# Patient Record
Sex: MALE | Race: ASIAN | NOT HISPANIC OR LATINO
[De-identification: names, ages, dates, MRNs, and addresses within clinical notes are randomized per-mention and may not be internally consistent; named-entity substitution may affect disease eponyms.]

---

## 2017-03-02 ENCOUNTER — TRANSCRIPTION ENCOUNTER (OUTPATIENT)
Age: 37
End: 2017-03-02

## 2017-03-03 ENCOUNTER — OUTPATIENT (OUTPATIENT)
Dept: OUTPATIENT SERVICES | Facility: HOSPITAL | Age: 37
LOS: 1 days | Discharge: HOME | End: 2017-03-03
Payer: MEDICAID

## 2017-03-03 VITALS
OXYGEN SATURATION: 98 % | HEART RATE: 74 BPM | SYSTOLIC BLOOD PRESSURE: 125 MMHG | DIASTOLIC BLOOD PRESSURE: 85 MMHG | RESPIRATION RATE: 16 BRPM

## 2017-03-03 VITALS
RESPIRATION RATE: 17 BRPM | DIASTOLIC BLOOD PRESSURE: 71 MMHG | OXYGEN SATURATION: 97 % | SYSTOLIC BLOOD PRESSURE: 116 MMHG | TEMPERATURE: 97 F | WEIGHT: 195.11 LBS | HEIGHT: 70 IN | HEART RATE: 82 BPM

## 2017-03-03 DIAGNOSIS — R10.9 UNSPECIFIED ABDOMINAL PAIN: ICD-10-CM

## 2017-03-03 DIAGNOSIS — B19.10 UNSPECIFIED VIRAL HEPATITIS B WITHOUT HEPATIC COMA: Chronic | ICD-10-CM

## 2017-03-03 PROCEDURE — 88305 TISSUE EXAM BY PATHOLOGIST: CPT | Mod: 26

## 2017-03-03 PROCEDURE — 88312 SPECIAL STAINS GROUP 1: CPT | Mod: 26

## 2017-03-03 RX ORDER — SODIUM CHLORIDE 9 MG/ML
1000 INJECTION, SOLUTION INTRAVENOUS
Qty: 0 | Refills: 0 | Status: DISCONTINUED | OUTPATIENT
Start: 2017-03-03 | End: 2017-03-03

## 2017-03-03 RX ADMIN — SODIUM CHLORIDE 75 MILLILITER(S): 9 INJECTION, SOLUTION INTRAVENOUS at 09:32

## 2017-03-03 NOTE — ASU DISCHARGE PLAN (ADULT/PEDIATRIC). - INSTRUCTIONS
Please call Dr. Hernandez's office at  to been seen in a follow up office visit two weeks after the completion of your procedure.

## 2017-03-03 NOTE — ASU DISCHARGE PLAN (ADULT/PEDIATRIC). - SPECIAL INSTRUCTIONS
No aspirin or aspirin like medications (e.g.  Motrin, Advil, Naprosyn, Aleve, etc.) Tylenol is fine.   Davis diet for lunch. Resume previous diet for dinner.

## 2017-03-06 LAB — SURGICAL PATHOLOGY FINAL REPORT - CH: SIGNIFICANT CHANGE UP

## 2017-03-08 DIAGNOSIS — B96.81 HELICOBACTER PYLORI [H. PYLORI] AS THE CAUSE OF DISEASES CLASSIFIED ELSEWHERE: ICD-10-CM

## 2017-03-08 DIAGNOSIS — K29.50 UNSPECIFIED CHRONIC GASTRITIS WITHOUT BLEEDING: ICD-10-CM

## 2017-03-08 DIAGNOSIS — K26.9 DUODENAL ULCER, UNSPECIFIED AS ACUTE OR CHRONIC, WITHOUT HEMORRHAGE OR PERFORATION: ICD-10-CM

## 2017-03-08 DIAGNOSIS — B19.10 UNSPECIFIED VIRAL HEPATITIS B WITHOUT HEPATIC COMA: ICD-10-CM

## 2017-03-08 DIAGNOSIS — K29.80 DUODENITIS WITHOUT BLEEDING: ICD-10-CM

## 2018-02-19 ENCOUNTER — EMERGENCY (EMERGENCY)
Facility: HOSPITAL | Age: 38
LOS: 0 days | Discharge: HOME | End: 2018-02-19
Attending: STUDENT IN AN ORGANIZED HEALTH CARE EDUCATION/TRAINING PROGRAM
Payer: MEDICAID

## 2018-02-19 VITALS
OXYGEN SATURATION: 99 % | DIASTOLIC BLOOD PRESSURE: 92 MMHG | RESPIRATION RATE: 18 BRPM | TEMPERATURE: 97 F | HEART RATE: 77 BPM | HEIGHT: 71 IN | WEIGHT: 195.11 LBS | SYSTOLIC BLOOD PRESSURE: 156 MMHG

## 2018-02-19 VITALS
TEMPERATURE: 98 F | OXYGEN SATURATION: 100 % | RESPIRATION RATE: 16 BRPM | SYSTOLIC BLOOD PRESSURE: 146 MMHG | HEART RATE: 84 BPM | DIASTOLIC BLOOD PRESSURE: 81 MMHG

## 2018-02-19 DIAGNOSIS — R10.31 RIGHT LOWER QUADRANT PAIN: ICD-10-CM

## 2018-02-19 DIAGNOSIS — B19.10 UNSPECIFIED VIRAL HEPATITIS B WITHOUT HEPATIC COMA: Chronic | ICD-10-CM

## 2018-02-19 DIAGNOSIS — B19.10 UNSPECIFIED VIRAL HEPATITIS B WITHOUT HEPATIC COMA: ICD-10-CM

## 2018-02-19 DIAGNOSIS — N20.0 CALCULUS OF KIDNEY: ICD-10-CM

## 2018-02-19 LAB
ALBUMIN SERPL ELPH-MCNC: 4.2 G/DL — SIGNIFICANT CHANGE UP (ref 3.3–5)
ALP SERPL-CCNC: 75 U/L — SIGNIFICANT CHANGE UP (ref 40–120)
ALT FLD-CCNC: 67 U/L — SIGNIFICANT CHANGE UP (ref 12–78)
AMYLASE P1 CFR SERPL: 43 U/L — SIGNIFICANT CHANGE UP (ref 25–115)
ANION GAP SERPL CALC-SCNC: 12 MMOL/L — SIGNIFICANT CHANGE UP (ref 5–17)
APPEARANCE UR: CLEAR — SIGNIFICANT CHANGE UP
AST SERPL-CCNC: 23 U/L — SIGNIFICANT CHANGE UP (ref 15–37)
BASOPHILS # BLD AUTO: 0.05 K/UL — SIGNIFICANT CHANGE UP (ref 0–0.2)
BASOPHILS NFR BLD AUTO: 0.5 % — SIGNIFICANT CHANGE UP (ref 0–2)
BILIRUB DIRECT SERPL-MCNC: 0.1 MG/DL — SIGNIFICANT CHANGE UP (ref 0.05–0.2)
BILIRUB INDIRECT FLD-MCNC: 0.2 MG/DL — SIGNIFICANT CHANGE UP (ref 0.2–1)
BILIRUB SERPL-MCNC: 0.3 MG/DL — SIGNIFICANT CHANGE UP (ref 0.2–1.2)
BILIRUB UR-MCNC: NEGATIVE — SIGNIFICANT CHANGE UP
BUN SERPL-MCNC: 12 MG/DL — SIGNIFICANT CHANGE UP (ref 7–23)
CALCIUM SERPL-MCNC: 9.1 MG/DL — SIGNIFICANT CHANGE UP (ref 8.5–10.1)
CHLORIDE SERPL-SCNC: 105 MMOL/L — SIGNIFICANT CHANGE UP (ref 96–108)
CO2 SERPL-SCNC: 22 MMOL/L — SIGNIFICANT CHANGE UP (ref 22–31)
COLOR SPEC: YELLOW — SIGNIFICANT CHANGE UP
CREAT SERPL-MCNC: 1.22 MG/DL — SIGNIFICANT CHANGE UP (ref 0.5–1.3)
DIFF PNL FLD: ABNORMAL
EOSINOPHIL # BLD AUTO: 0.29 K/UL — SIGNIFICANT CHANGE UP (ref 0–0.5)
EOSINOPHIL NFR BLD AUTO: 2.9 % — SIGNIFICANT CHANGE UP (ref 0–6)
EPI CELLS # UR: SIGNIFICANT CHANGE UP
GLUCOSE SERPL-MCNC: 134 MG/DL — HIGH (ref 70–99)
GLUCOSE UR QL: NEGATIVE MG/DL — SIGNIFICANT CHANGE UP
HCT VFR BLD CALC: 46.8 % — SIGNIFICANT CHANGE UP (ref 39–50)
HGB BLD-MCNC: 16.2 G/DL — SIGNIFICANT CHANGE UP (ref 13–17)
IMM GRANULOCYTES NFR BLD AUTO: 1.1 % — SIGNIFICANT CHANGE UP (ref 0–1.5)
KETONES UR-MCNC: NEGATIVE — SIGNIFICANT CHANGE UP
LEUKOCYTE ESTERASE UR-ACNC: NEGATIVE — SIGNIFICANT CHANGE UP
LIDOCAIN IGE QN: 140 U/L — SIGNIFICANT CHANGE UP (ref 73–393)
LYMPHOCYTES # BLD AUTO: 4.13 K/UL — HIGH (ref 1–3.3)
LYMPHOCYTES # BLD AUTO: 40.8 % — SIGNIFICANT CHANGE UP (ref 13–44)
MCHC RBC-ENTMCNC: 27 PG — SIGNIFICANT CHANGE UP (ref 27–34)
MCHC RBC-ENTMCNC: 34.6 GM/DL — SIGNIFICANT CHANGE UP (ref 32–36)
MCV RBC AUTO: 78 FL — LOW (ref 80–100)
MONOCYTES # BLD AUTO: 0.5 K/UL — SIGNIFICANT CHANGE UP (ref 0–0.9)
MONOCYTES NFR BLD AUTO: 4.9 % — SIGNIFICANT CHANGE UP (ref 2–14)
NEUTROPHILS # BLD AUTO: 5.04 K/UL — SIGNIFICANT CHANGE UP (ref 1.8–7.4)
NEUTROPHILS NFR BLD AUTO: 49.8 % — SIGNIFICANT CHANGE UP (ref 43–77)
NITRITE UR-MCNC: NEGATIVE — SIGNIFICANT CHANGE UP
NRBC # BLD: 0 /100 WBCS — SIGNIFICANT CHANGE UP (ref 0–0)
PH UR: 6 — SIGNIFICANT CHANGE UP (ref 5–8)
PLATELET # BLD AUTO: 278 K/UL — SIGNIFICANT CHANGE UP (ref 150–400)
POTASSIUM SERPL-MCNC: 3.9 MMOL/L — SIGNIFICANT CHANGE UP (ref 3.5–5.3)
POTASSIUM SERPL-SCNC: 3.9 MMOL/L — SIGNIFICANT CHANGE UP (ref 3.5–5.3)
PROT SERPL-MCNC: 8.2 GM/DL — SIGNIFICANT CHANGE UP (ref 6–8.3)
PROT UR-MCNC: 15 MG/DL
RBC # BLD: 6 M/UL — HIGH (ref 4.2–5.8)
RBC # FLD: 12.2 % — SIGNIFICANT CHANGE UP (ref 10.3–14.5)
RBC CASTS # UR COMP ASSIST: ABNORMAL /HPF (ref 0–4)
SODIUM SERPL-SCNC: 139 MMOL/L — SIGNIFICANT CHANGE UP (ref 135–145)
SP GR SPEC: 1.02 — SIGNIFICANT CHANGE UP (ref 1.01–1.02)
UROBILINOGEN FLD QL: NEGATIVE MG/DL — SIGNIFICANT CHANGE UP
WBC # BLD: 10.12 K/UL — SIGNIFICANT CHANGE UP (ref 3.8–10.5)
WBC # FLD AUTO: 10.12 K/UL — SIGNIFICANT CHANGE UP (ref 3.8–10.5)
WBC UR QL: SIGNIFICANT CHANGE UP

## 2018-02-19 PROCEDURE — 74176 CT ABD & PELVIS W/O CONTRAST: CPT | Mod: 26

## 2018-02-19 PROCEDURE — 99285 EMERGENCY DEPT VISIT HI MDM: CPT | Mod: 25

## 2018-02-19 RX ORDER — KETOROLAC TROMETHAMINE 30 MG/ML
30 SYRINGE (ML) INJECTION ONCE
Qty: 0 | Refills: 0 | Status: DISCONTINUED | OUTPATIENT
Start: 2018-02-19 | End: 2018-02-19

## 2018-02-19 RX ORDER — ONDANSETRON 8 MG/1
8 TABLET, FILM COATED ORAL ONCE
Qty: 0 | Refills: 0 | Status: COMPLETED | OUTPATIENT
Start: 2018-02-19 | End: 2018-02-19

## 2018-02-19 RX ORDER — MORPHINE SULFATE 50 MG/1
2 CAPSULE, EXTENDED RELEASE ORAL ONCE
Qty: 0 | Refills: 0 | Status: DISCONTINUED | OUTPATIENT
Start: 2018-02-19 | End: 2018-02-19

## 2018-02-19 RX ORDER — SODIUM CHLORIDE 9 MG/ML
2000 INJECTION INTRAMUSCULAR; INTRAVENOUS; SUBCUTANEOUS ONCE
Qty: 0 | Refills: 0 | Status: COMPLETED | OUTPATIENT
Start: 2018-02-19 | End: 2018-02-19

## 2018-02-19 RX ORDER — MOXIFLOXACIN HYDROCHLORIDE TABLETS, 400 MG 400 MG/1
1 TABLET, FILM COATED ORAL
Qty: 14 | Refills: 0
Start: 2018-02-19 | End: 2018-02-25

## 2018-02-19 RX ORDER — SODIUM CHLORIDE 9 MG/ML
1000 INJECTION INTRAMUSCULAR; INTRAVENOUS; SUBCUTANEOUS
Qty: 0 | Refills: 0 | Status: DISCONTINUED | OUTPATIENT
Start: 2018-02-19 | End: 2018-02-19

## 2018-02-19 RX ORDER — TAMSULOSIN HYDROCHLORIDE 0.4 MG/1
1 CAPSULE ORAL
Qty: 7 | Refills: 0
Start: 2018-02-19 | End: 2018-02-25

## 2018-02-19 RX ADMIN — Medication 30 MILLIGRAM(S): at 09:02

## 2018-02-19 RX ADMIN — Medication 30 MILLIGRAM(S): at 07:48

## 2018-02-19 RX ADMIN — ONDANSETRON 8 MILLIGRAM(S): 8 TABLET, FILM COATED ORAL at 07:48

## 2018-02-19 RX ADMIN — MORPHINE SULFATE 2 MILLIGRAM(S): 50 CAPSULE, EXTENDED RELEASE ORAL at 09:07

## 2018-02-19 RX ADMIN — MORPHINE SULFATE 2 MILLIGRAM(S): 50 CAPSULE, EXTENDED RELEASE ORAL at 11:47

## 2018-02-19 RX ADMIN — MORPHINE SULFATE 2 MILLIGRAM(S): 50 CAPSULE, EXTENDED RELEASE ORAL at 09:02

## 2018-02-19 RX ADMIN — SODIUM CHLORIDE 1000 MILLILITER(S): 9 INJECTION INTRAMUSCULAR; INTRAVENOUS; SUBCUTANEOUS at 07:48

## 2018-02-19 RX ADMIN — MORPHINE SULFATE 2 MILLIGRAM(S): 50 CAPSULE, EXTENDED RELEASE ORAL at 07:48

## 2018-02-19 NOTE — ED PROVIDER NOTE - OBJECTIVE STATEMENT
37 year old male presents today c/o sudden onset of right flank pain two hours ago, he describes a severe, sharp and constant pain radiating to his abd right lower abdomen (-) nausea or vomiting (-) fevers or chills, pt states that he has a previous history of kidney stones

## 2018-02-19 NOTE — ED ADULT TRIAGE NOTE - CHIEF COMPLAINT QUOTE
"I have pain" reports having upper right abdominal pain starting 2 hours pta, denies n/v/d/ fever, chills.

## 2018-02-19 NOTE — ED PROVIDER NOTE - CONSTITUTIONAL, MLM
normal... Well nourished, awake, alert, oriented to person, place, time/situation, pt is restless holding his right side and appears in pain

## 2018-02-19 NOTE — ED PROVIDER NOTE - CARE PLAN
Principal Discharge DX:	Flank pain Principal Discharge DX:	Flank pain  Secondary Diagnosis:	Kidney stone on right side

## 2018-02-19 NOTE — ED ADULT NURSE NOTE - OBJECTIVE STATEMENT
Patient alert with wife at bedside stating that he started to get right sided abdominal pain about 30 minutes ago. History of Hepatitis B and kidney stones. Takes naproxin at home for shoulder pain. Denies nausea, vomiting, diarrhea.

## 2018-02-20 LAB
CULTURE RESULTS: NO GROWTH — SIGNIFICANT CHANGE UP
SPECIMEN SOURCE: SIGNIFICANT CHANGE UP

## 2019-06-19 NOTE — ASU PATIENT PROFILE, ADULT - TEACHING/LEARNING RELIGIOUS CONSIDERATIONS
The Delivery OB Provider certifies that vaginal examination and/or abdominal examination after the delivery was done and no foreign body was found.
none

## 2019-07-30 ENCOUNTER — EMERGENCY (EMERGENCY)
Facility: HOSPITAL | Age: 39
LOS: 1 days | Discharge: HOME | End: 2019-07-30
Admitting: EMERGENCY MEDICINE
Payer: MEDICAID

## 2019-07-30 VITALS
HEART RATE: 75 BPM | OXYGEN SATURATION: 100 % | DIASTOLIC BLOOD PRESSURE: 94 MMHG | RESPIRATION RATE: 18 BRPM | TEMPERATURE: 98 F | SYSTOLIC BLOOD PRESSURE: 157 MMHG

## 2019-07-30 DIAGNOSIS — B19.10 UNSPECIFIED VIRAL HEPATITIS B WITHOUT HEPATIC COMA: Chronic | ICD-10-CM

## 2019-07-30 PROCEDURE — 99284 EMERGENCY DEPT VISIT MOD MDM: CPT

## 2019-07-30 NOTE — ED ADULT TRIAGE NOTE - CHIEF COMPLAINT QUOTE
Pt c/o right sided headache x3 days. Denies change in vision/n/v. Denies falls/head injury. Pt states he takes ibuprofen and Excedrin with little relief. PMH of Hepatitis B

## 2019-07-30 NOTE — ED ADULT TRIAGE NOTE - ESI TRIAGE ACUITY LEVEL, MLM
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

## 2019-07-31 VITALS
OXYGEN SATURATION: 100 % | RESPIRATION RATE: 18 BRPM | SYSTOLIC BLOOD PRESSURE: 137 MMHG | DIASTOLIC BLOOD PRESSURE: 86 MMHG | HEART RATE: 72 BPM | TEMPERATURE: 98 F

## 2019-07-31 LAB
ANION GAP SERPL CALC-SCNC: 10 MMO/L — SIGNIFICANT CHANGE UP (ref 7–14)
BASOPHILS # BLD AUTO: 0.03 K/UL — SIGNIFICANT CHANGE UP (ref 0–0.2)
BASOPHILS NFR BLD AUTO: 0.3 % — SIGNIFICANT CHANGE UP (ref 0–2)
BUN SERPL-MCNC: 11 MG/DL — SIGNIFICANT CHANGE UP (ref 7–23)
CALCIUM SERPL-MCNC: 9 MG/DL — SIGNIFICANT CHANGE UP (ref 8.4–10.5)
CHLORIDE SERPL-SCNC: 105 MMOL/L — SIGNIFICANT CHANGE UP (ref 98–107)
CO2 SERPL-SCNC: 24 MMOL/L — SIGNIFICANT CHANGE UP (ref 22–31)
CREAT SERPL-MCNC: 0.9 MG/DL — SIGNIFICANT CHANGE UP (ref 0.5–1.3)
EOSINOPHIL # BLD AUTO: 0.62 K/UL — HIGH (ref 0–0.5)
EOSINOPHIL NFR BLD AUTO: 7.1 % — HIGH (ref 0–6)
GLUCOSE SERPL-MCNC: 126 MG/DL — HIGH (ref 70–99)
HCT VFR BLD CALC: 44 % — SIGNIFICANT CHANGE UP (ref 39–50)
HGB BLD-MCNC: 14.3 G/DL — SIGNIFICANT CHANGE UP (ref 13–17)
IMM GRANULOCYTES NFR BLD AUTO: 0.3 % — SIGNIFICANT CHANGE UP (ref 0–1.5)
LYMPHOCYTES # BLD AUTO: 4 K/UL — HIGH (ref 1–3.3)
LYMPHOCYTES # BLD AUTO: 45.7 % — HIGH (ref 13–44)
MCHC RBC-ENTMCNC: 26.7 PG — LOW (ref 27–34)
MCHC RBC-ENTMCNC: 32.5 % — SIGNIFICANT CHANGE UP (ref 32–36)
MCV RBC AUTO: 82.1 FL — SIGNIFICANT CHANGE UP (ref 80–100)
MONOCYTES # BLD AUTO: 0.57 K/UL — SIGNIFICANT CHANGE UP (ref 0–0.9)
MONOCYTES NFR BLD AUTO: 6.5 % — SIGNIFICANT CHANGE UP (ref 2–14)
NEUTROPHILS # BLD AUTO: 3.5 K/UL — SIGNIFICANT CHANGE UP (ref 1.8–7.4)
NEUTROPHILS NFR BLD AUTO: 40.1 % — LOW (ref 43–77)
NRBC # FLD: 0 K/UL — SIGNIFICANT CHANGE UP (ref 0–0)
PLATELET # BLD AUTO: 248 K/UL — SIGNIFICANT CHANGE UP (ref 150–400)
PMV BLD: 10 FL — SIGNIFICANT CHANGE UP (ref 7–13)
POTASSIUM SERPL-MCNC: 3.9 MMOL/L — SIGNIFICANT CHANGE UP (ref 3.5–5.3)
POTASSIUM SERPL-SCNC: 3.9 MMOL/L — SIGNIFICANT CHANGE UP (ref 3.5–5.3)
RBC # BLD: 5.36 M/UL — SIGNIFICANT CHANGE UP (ref 4.2–5.8)
RBC # FLD: 12.1 % — SIGNIFICANT CHANGE UP (ref 10.3–14.5)
SODIUM SERPL-SCNC: 139 MMOL/L — SIGNIFICANT CHANGE UP (ref 135–145)
WBC # BLD: 8.75 K/UL — SIGNIFICANT CHANGE UP (ref 3.8–10.5)
WBC # FLD AUTO: 8.75 K/UL — SIGNIFICANT CHANGE UP (ref 3.8–10.5)

## 2019-07-31 PROCEDURE — 70450 CT HEAD/BRAIN W/O DYE: CPT | Mod: 26

## 2019-07-31 RX ORDER — ACETAMINOPHEN 500 MG
975 TABLET ORAL ONCE
Refills: 0 | Status: COMPLETED | OUTPATIENT
Start: 2019-07-31 | End: 2019-07-31

## 2019-07-31 RX ORDER — MAGNESIUM SULFATE 500 MG/ML
2 VIAL (ML) INJECTION ONCE
Refills: 0 | Status: COMPLETED | OUTPATIENT
Start: 2019-07-31 | End: 2019-07-31

## 2019-07-31 RX ORDER — METOCLOPRAMIDE HCL 10 MG
10 TABLET ORAL ONCE
Refills: 0 | Status: COMPLETED | OUTPATIENT
Start: 2019-07-31 | End: 2019-07-31

## 2019-07-31 RX ORDER — SODIUM CHLORIDE 9 MG/ML
1000 INJECTION INTRAMUSCULAR; INTRAVENOUS; SUBCUTANEOUS ONCE
Refills: 0 | Status: COMPLETED | OUTPATIENT
Start: 2019-07-31 | End: 2019-07-31

## 2019-07-31 RX ORDER — KETOROLAC TROMETHAMINE 30 MG/ML
15 SYRINGE (ML) INJECTION ONCE
Refills: 0 | Status: DISCONTINUED | OUTPATIENT
Start: 2019-07-31 | End: 2019-07-31

## 2019-07-31 RX ADMIN — SODIUM CHLORIDE 1000 MILLILITER(S): 9 INJECTION INTRAMUSCULAR; INTRAVENOUS; SUBCUTANEOUS at 01:50

## 2019-07-31 RX ADMIN — Medication 15 MILLIGRAM(S): at 00:36

## 2019-07-31 RX ADMIN — Medication 15 MILLIGRAM(S): at 01:59

## 2019-07-31 RX ADMIN — SODIUM CHLORIDE 1000 MILLILITER(S): 9 INJECTION INTRAMUSCULAR; INTRAVENOUS; SUBCUTANEOUS at 00:37

## 2019-07-31 RX ADMIN — Medication 50 GRAM(S): at 03:29

## 2019-07-31 RX ADMIN — SODIUM CHLORIDE 1000 MILLILITER(S): 9 INJECTION INTRAMUSCULAR; INTRAVENOUS; SUBCUTANEOUS at 01:59

## 2019-07-31 RX ADMIN — Medication 15 MILLIGRAM(S): at 02:29

## 2019-07-31 RX ADMIN — SODIUM CHLORIDE 1000 MILLILITER(S): 9 INJECTION INTRAMUSCULAR; INTRAVENOUS; SUBCUTANEOUS at 01:22

## 2019-07-31 RX ADMIN — Medication 975 MILLIGRAM(S): at 03:58

## 2019-07-31 RX ADMIN — Medication 10 MILLIGRAM(S): at 00:36

## 2019-07-31 RX ADMIN — Medication 975 MILLIGRAM(S): at 03:28

## 2019-07-31 RX ADMIN — Medication 15 MILLIGRAM(S): at 00:51

## 2019-07-31 NOTE — ED ADULT NURSE NOTE - OBJECTIVE STATEMENT
Pt. received in intake room 5, A&Ox4, ambulatory. Pt. c/o right sided headache x 3 days. Denies vision changes, n/v/d, trauma, fall, injury, chest pain, SOB, dizziness, weakness. Reports taking ibuprofen and excedrin with some relief at home yesterday. Respirations are even and unlabored on room air. 20 gauge IV inserted in right hand, labs sent. Medication given as per order. MD evaluation in progress.

## 2019-07-31 NOTE — ED ADULT NURSE REASSESSMENT NOTE - NS ED NURSE REASSESS COMMENT FT1
Pt reports pain still 8/10 with no relief from pain medication. PA was at bedside for evaluation. Awaiting further orders at this time. Will continue to monitor.

## 2019-07-31 NOTE — ED PROVIDER NOTE - CLINICAL SUMMARY MEDICAL DECISION MAKING FREE TEXT BOX
38 yo M, nonsmoker with PMH of Hep B with treatment in the past, who presents to the ER c/o right side headache x2 days. Well appearing male p/w acute gradual onset of headache x2 days, constant, no improvement with pain med, no head trauma, not on anticoagulation, no visual disturbances, no weakness, no numbness, no family hx of brain aneurysm/hemorrhage, no neck stiffness, no nuchal rigidity, afebrile in ED ; pt is AAOx3, GCS of 15, no focal neuro deficits, ambulatory, likely migraine, dehydration. No CT head indicated at this time. Discussed plan with patient & wife, patient agrees with treatment, and if no improvement, will consider CT head. Plan: obtain labs, give IVF for hydration, Reglan & Toradol for headache, and reassess.

## 2019-07-31 NOTE — ED PROVIDER NOTE - NSFOLLOWUPINSTRUCTIONS_ED_ALL_ED_FT
Rest, drink plenty of fluids.  Advance activity as tolerated. Take Tylenol 650mg (Two 325 mg pills) every 4-6 hours as needed for pain. Take Motrin 600 mg every 8 hours as needed for moderate pain -- take with food.  Follow up with your primary care physician and neurology in 48-72 hours- bring copies of your results.  Return to the ER for worsening or persistent symptoms, and/or ANY NEW OR CONCERNING SYMPTOMS. If you have issues obtaining follow up, please call: [phone] to obtain a doctor or specialist who takes your insurance in your area.

## 2019-07-31 NOTE — ED ADULT NURSE NOTE - NSIMPLEMENTINTERV_GEN_ALL_ED
Implemented All Universal Safety Interventions:  [place] to call system. Call bell, personal items and telephone within reach. Instruct patient to call for assistance. Room bathroom lighting operational. Non-slip footwear when patient is off stretcher. Physically safe environment: no spills, clutter or unnecessary equipment. Stretcher in lowest position, wheels locked, appropriate side rails in place.

## 2019-07-31 NOTE — ED PROVIDER NOTE - PROGRESS NOTE DETAILS
PA KATELYN: Patient reassessed, lying comfortably in bed in NAD, denies any complaints. States pain 8/10, not much improvement. Pt given additional IVF, toradol total of 30mg and Reglan. Discussed plan with patient and wife, will order Magnesium, Tylenol 975mg, CT head given not much improvement of headache. Will continue to monitor and reassess. PA KATELYN: Patient reassessed, sitting comfortably in chair in NAD, denies any complaints. States feeling better, symptoms improved, pain 5/10. CT head neg acute finding. Pt is medically stable for discharge and follow up with PMD and neurology. The patient was given verbal and written discharge instructions. Specifically, instructions when to return to the ED and when to seek follow-up from their pcp was discussed. Any specialty follow-up was discussed, including how to make an appointment.  Instructions were discussed in simple, plain language and was understood by the patient. The patient understands that should their symptoms worsen or any new symptoms arise, they should return to the ED immediately for further evaluation. All pt's questions were answered. Patient verbalizes understanding.

## 2019-07-31 NOTE — ED PROVIDER NOTE - OBJECTIVE STATEMENT
38 yo M, nonsmoker with PMH of Hep B (treated) who presents to the ER c/o right side headache x2 days. Pt states onset of pain is gradual. Reports character of pain is sharp, constant, right temporal, 9/10 in severity, non-radiating, no aggravating or relieving factors. Admits taking Ibuprofen and Excedrin without any improvement. Admits having high blood pressure at home, . As per wife, patient has similar headache last year, also while having high blood pressure in 160's. Denies any fever, chills, n/v/d/c, dizziness, visual disturbances, neck stiffness, photophobia, head trauma, otalgia, chest pain, sob, abdominal pain, back pain, dysuria, weakness, numbness, recent travel, sick contact or any other complaints.

## 2019-07-31 NOTE — ED ADULT NURSE REASSESSMENT NOTE - NS ED NURSE REASSESS COMMENT FT1
Pt placed on cardiac monitor for administration of magnesium sulfate as per MD order. Pt given magnesium sulfate and tylenol as per MD order. Pt in no acute distress. Will continue to monitor.

## 2019-12-18 ENCOUNTER — EMERGENCY (EMERGENCY)
Facility: HOSPITAL | Age: 39
LOS: 1 days | Discharge: HOME | End: 2019-12-18
Admitting: EMERGENCY MEDICINE
Payer: MEDICAID

## 2019-12-18 VITALS
HEART RATE: 78 BPM | SYSTOLIC BLOOD PRESSURE: 142 MMHG | DIASTOLIC BLOOD PRESSURE: 85 MMHG | TEMPERATURE: 98 F | RESPIRATION RATE: 17 BRPM | OXYGEN SATURATION: 98 %

## 2019-12-18 DIAGNOSIS — B19.10 UNSPECIFIED VIRAL HEPATITIS B WITHOUT HEPATIC COMA: Chronic | ICD-10-CM

## 2019-12-18 LAB
ALBUMIN SERPL ELPH-MCNC: 4.8 G/DL — SIGNIFICANT CHANGE UP (ref 3.3–5)
ALP SERPL-CCNC: 63 U/L — SIGNIFICANT CHANGE UP (ref 40–120)
ALT FLD-CCNC: 32 U/L — SIGNIFICANT CHANGE UP (ref 4–41)
ANION GAP SERPL CALC-SCNC: 16 MMO/L — HIGH (ref 7–14)
AST SERPL-CCNC: 19 U/L — SIGNIFICANT CHANGE UP (ref 4–40)
BASOPHILS # BLD AUTO: 0.04 K/UL — SIGNIFICANT CHANGE UP (ref 0–0.2)
BASOPHILS NFR BLD AUTO: 0.4 % — SIGNIFICANT CHANGE UP (ref 0–2)
BILIRUB SERPL-MCNC: 0.4 MG/DL — SIGNIFICANT CHANGE UP (ref 0.2–1.2)
BUN SERPL-MCNC: 9 MG/DL — SIGNIFICANT CHANGE UP (ref 7–23)
CALCIUM SERPL-MCNC: 9.1 MG/DL — SIGNIFICANT CHANGE UP (ref 8.4–10.5)
CHLORIDE SERPL-SCNC: 99 MMOL/L — SIGNIFICANT CHANGE UP (ref 98–107)
CO2 SERPL-SCNC: 23 MMOL/L — SIGNIFICANT CHANGE UP (ref 22–31)
CREAT SERPL-MCNC: 0.9 MG/DL — SIGNIFICANT CHANGE UP (ref 0.5–1.3)
EOSINOPHIL # BLD AUTO: 0.55 K/UL — HIGH (ref 0–0.5)
EOSINOPHIL NFR BLD AUTO: 5.3 % — SIGNIFICANT CHANGE UP (ref 0–6)
GLUCOSE SERPL-MCNC: 102 MG/DL — HIGH (ref 70–99)
HCT VFR BLD CALC: 46.9 % — SIGNIFICANT CHANGE UP (ref 39–50)
HGB BLD-MCNC: 15.3 G/DL — SIGNIFICANT CHANGE UP (ref 13–17)
IMM GRANULOCYTES NFR BLD AUTO: 0.1 % — SIGNIFICANT CHANGE UP (ref 0–1.5)
LIDOCAIN IGE QN: 28.1 U/L — SIGNIFICANT CHANGE UP (ref 7–60)
LYMPHOCYTES # BLD AUTO: 4.38 K/UL — HIGH (ref 1–3.3)
LYMPHOCYTES # BLD AUTO: 42.1 % — SIGNIFICANT CHANGE UP (ref 13–44)
MCHC RBC-ENTMCNC: 26.4 PG — LOW (ref 27–34)
MCHC RBC-ENTMCNC: 32.6 % — SIGNIFICANT CHANGE UP (ref 32–36)
MCV RBC AUTO: 81 FL — SIGNIFICANT CHANGE UP (ref 80–100)
MONOCYTES # BLD AUTO: 0.72 K/UL — SIGNIFICANT CHANGE UP (ref 0–0.9)
MONOCYTES NFR BLD AUTO: 6.9 % — SIGNIFICANT CHANGE UP (ref 2–14)
NEUTROPHILS # BLD AUTO: 4.7 K/UL — SIGNIFICANT CHANGE UP (ref 1.8–7.4)
NEUTROPHILS NFR BLD AUTO: 45.2 % — SIGNIFICANT CHANGE UP (ref 43–77)
NRBC # FLD: 0 K/UL — SIGNIFICANT CHANGE UP (ref 0–0)
PLATELET # BLD AUTO: 270 K/UL — SIGNIFICANT CHANGE UP (ref 150–400)
PMV BLD: 9.9 FL — SIGNIFICANT CHANGE UP (ref 7–13)
POTASSIUM SERPL-MCNC: 3.6 MMOL/L — SIGNIFICANT CHANGE UP (ref 3.5–5.3)
POTASSIUM SERPL-SCNC: 3.6 MMOL/L — SIGNIFICANT CHANGE UP (ref 3.5–5.3)
PROT SERPL-MCNC: 7.7 G/DL — SIGNIFICANT CHANGE UP (ref 6–8.3)
RBC # BLD: 5.79 M/UL — SIGNIFICANT CHANGE UP (ref 4.2–5.8)
RBC # FLD: 11.9 % — SIGNIFICANT CHANGE UP (ref 10.3–14.5)
SODIUM SERPL-SCNC: 138 MMOL/L — SIGNIFICANT CHANGE UP (ref 135–145)
WBC # BLD: 10.4 K/UL — SIGNIFICANT CHANGE UP (ref 3.8–10.5)
WBC # FLD AUTO: 10.4 K/UL — SIGNIFICANT CHANGE UP (ref 3.8–10.5)

## 2019-12-18 PROCEDURE — 99284 EMERGENCY DEPT VISIT MOD MDM: CPT

## 2019-12-18 NOTE — ED PROVIDER NOTE - PATIENT PORTAL LINK FT
You can access the FollowMyHealth Patient Portal offered by [facility] by registering at the following website: [email or web address]. By joining [employer]’s FollowMyHealth portal, you will also be able to view your health information using other applications (apps) compatible with our system.

## 2019-12-18 NOTE — ED ADULT NURSE NOTE - OBJECTIVE STATEMENT
patient aaox4. came in with abdominal pain. patient reports he has been feeling pain for 2 days. denies dysuria, hematuria, constipation, diarrhea, acid reflux, nausea, vomiting. reports bloating and gas. did not eat or drink anything out of the ordinary. labs drawn and sent. Will continue to monitor

## 2019-12-18 NOTE — ED ADULT TRIAGE NOTE - CHIEF COMPLAINT QUOTE
pt arrives w/ c/o abdominal pain. pt states pain began after eating yesterday. pt states went to urgent and had decreased bowel sounds. pt denies any nausea or vomiting. pt states last BM last night. pmh Hep B.

## 2019-12-18 NOTE — ED PROVIDER NOTE - OBJECTIVE STATEMENT
38 y/o male hx HTN Hep B presents to ER c/o abdominal pain x 2 days. Pt. states for the past 2-3 days has been experiencing worsneing generalized abdominal pain with nausea - no vomit. States symptoms becmae much worse today - went to urgent care - was told decreased bowel sounds right side, had cxr showing stool burden - sent to ER for CT. Pt. c/o 8/10 pain. Dneies fever chills vomit diarrhea dizziness.

## 2019-12-19 VITALS
RESPIRATION RATE: 18 BRPM | OXYGEN SATURATION: 98 % | DIASTOLIC BLOOD PRESSURE: 97 MMHG | SYSTOLIC BLOOD PRESSURE: 137 MMHG | HEART RATE: 70 BPM

## 2019-12-19 PROCEDURE — 74177 CT ABD & PELVIS W/CONTRAST: CPT | Mod: 26

## 2019-12-19 RX ORDER — DOCUSATE SODIUM 100 MG
1 CAPSULE ORAL
Qty: 14 | Refills: 0
Start: 2019-12-19 | End: 2019-12-25

## 2019-12-19 RX ORDER — FAMOTIDINE 10 MG/ML
1 INJECTION INTRAVENOUS
Qty: 7 | Refills: 0
Start: 2019-12-19 | End: 2019-12-25

## 2020-07-07 ENCOUNTER — EMERGENCY (EMERGENCY)
Facility: HOSPITAL | Age: 40
LOS: 0 days | Discharge: HOME | End: 2020-07-07
Attending: EMERGENCY MEDICINE
Payer: MEDICAID

## 2020-07-07 VITALS
HEART RATE: 69 BPM | SYSTOLIC BLOOD PRESSURE: 169 MMHG | HEIGHT: 70 IN | RESPIRATION RATE: 17 BRPM | DIASTOLIC BLOOD PRESSURE: 107 MMHG | OXYGEN SATURATION: 100 % | TEMPERATURE: 99 F | WEIGHT: 181 LBS

## 2020-07-07 VITALS
DIASTOLIC BLOOD PRESSURE: 70 MMHG | HEART RATE: 55 BPM | TEMPERATURE: 98 F | RESPIRATION RATE: 16 BRPM | OXYGEN SATURATION: 98 % | SYSTOLIC BLOOD PRESSURE: 111 MMHG

## 2020-07-07 DIAGNOSIS — Z86.19 PERSONAL HISTORY OF OTHER INFECTIOUS AND PARASITIC DISEASES: ICD-10-CM

## 2020-07-07 DIAGNOSIS — N20.0 CALCULUS OF KIDNEY: ICD-10-CM

## 2020-07-07 DIAGNOSIS — B19.10 UNSPECIFIED VIRAL HEPATITIS B WITHOUT HEPATIC COMA: Chronic | ICD-10-CM

## 2020-07-07 DIAGNOSIS — R10.9 UNSPECIFIED ABDOMINAL PAIN: ICD-10-CM

## 2020-07-07 LAB
ALBUMIN SERPL ELPH-MCNC: 3.9 G/DL — SIGNIFICANT CHANGE UP (ref 3.3–5)
ALP SERPL-CCNC: 72 U/L — SIGNIFICANT CHANGE UP (ref 40–120)
ALT FLD-CCNC: 39 U/L — SIGNIFICANT CHANGE UP (ref 12–78)
ANION GAP SERPL CALC-SCNC: 5 MMOL/L — SIGNIFICANT CHANGE UP (ref 5–17)
APPEARANCE UR: CLEAR — SIGNIFICANT CHANGE UP
AST SERPL-CCNC: 18 U/L — SIGNIFICANT CHANGE UP (ref 15–37)
BACTERIA # UR AUTO: ABNORMAL
BASOPHILS # BLD AUTO: 0.04 K/UL — SIGNIFICANT CHANGE UP (ref 0–0.2)
BASOPHILS NFR BLD AUTO: 0.5 % — SIGNIFICANT CHANGE UP (ref 0–2)
BILIRUB SERPL-MCNC: 0.3 MG/DL — SIGNIFICANT CHANGE UP (ref 0.2–1.2)
BILIRUB UR-MCNC: NEGATIVE — SIGNIFICANT CHANGE UP
BUN SERPL-MCNC: 16 MG/DL — SIGNIFICANT CHANGE UP (ref 7–23)
CALCIUM SERPL-MCNC: 8.8 MG/DL — SIGNIFICANT CHANGE UP (ref 8.5–10.1)
CHLORIDE SERPL-SCNC: 109 MMOL/L — HIGH (ref 96–108)
CO2 SERPL-SCNC: 27 MMOL/L — SIGNIFICANT CHANGE UP (ref 22–31)
COLOR SPEC: YELLOW — SIGNIFICANT CHANGE UP
CREAT SERPL-MCNC: 1.05 MG/DL — SIGNIFICANT CHANGE UP (ref 0.5–1.3)
DIFF PNL FLD: ABNORMAL
EOSINOPHIL # BLD AUTO: 0.3 K/UL — SIGNIFICANT CHANGE UP (ref 0–0.5)
EOSINOPHIL NFR BLD AUTO: 3.5 % — SIGNIFICANT CHANGE UP (ref 0–6)
EPI CELLS # UR: SIGNIFICANT CHANGE UP
GLUCOSE SERPL-MCNC: 97 MG/DL — SIGNIFICANT CHANGE UP (ref 70–99)
GLUCOSE UR QL: NEGATIVE MG/DL — SIGNIFICANT CHANGE UP
HCT VFR BLD CALC: 42.8 % — SIGNIFICANT CHANGE UP (ref 39–50)
HGB BLD-MCNC: 13.9 G/DL — SIGNIFICANT CHANGE UP (ref 13–17)
IMM GRANULOCYTES NFR BLD AUTO: 0.1 % — SIGNIFICANT CHANGE UP (ref 0–1.5)
KETONES UR-MCNC: NEGATIVE — SIGNIFICANT CHANGE UP
LEUKOCYTE ESTERASE UR-ACNC: NEGATIVE — SIGNIFICANT CHANGE UP
LIDOCAIN IGE QN: 136 U/L — SIGNIFICANT CHANGE UP (ref 73–393)
LYMPHOCYTES # BLD AUTO: 2.9 K/UL — SIGNIFICANT CHANGE UP (ref 1–3.3)
LYMPHOCYTES # BLD AUTO: 33.5 % — SIGNIFICANT CHANGE UP (ref 13–44)
MCHC RBC-ENTMCNC: 26.8 PG — LOW (ref 27–34)
MCHC RBC-ENTMCNC: 32.5 GM/DL — SIGNIFICANT CHANGE UP (ref 32–36)
MCV RBC AUTO: 82.5 FL — SIGNIFICANT CHANGE UP (ref 80–100)
MONOCYTES # BLD AUTO: 0.6 K/UL — SIGNIFICANT CHANGE UP (ref 0–0.9)
MONOCYTES NFR BLD AUTO: 6.9 % — SIGNIFICANT CHANGE UP (ref 2–14)
NEUTROPHILS # BLD AUTO: 4.8 K/UL — SIGNIFICANT CHANGE UP (ref 1.8–7.4)
NEUTROPHILS NFR BLD AUTO: 55.5 % — SIGNIFICANT CHANGE UP (ref 43–77)
NITRITE UR-MCNC: NEGATIVE — SIGNIFICANT CHANGE UP
NRBC # BLD: 0 /100 WBCS — SIGNIFICANT CHANGE UP (ref 0–0)
PH UR: 5 — SIGNIFICANT CHANGE UP (ref 5–8)
PLATELET # BLD AUTO: 232 K/UL — SIGNIFICANT CHANGE UP (ref 150–400)
POTASSIUM SERPL-MCNC: 4 MMOL/L — SIGNIFICANT CHANGE UP (ref 3.5–5.3)
POTASSIUM SERPL-SCNC: 4 MMOL/L — SIGNIFICANT CHANGE UP (ref 3.5–5.3)
PROT SERPL-MCNC: 7.7 GM/DL — SIGNIFICANT CHANGE UP (ref 6–8.3)
PROT UR-MCNC: NEGATIVE MG/DL — SIGNIFICANT CHANGE UP
RBC # BLD: 5.19 M/UL — SIGNIFICANT CHANGE UP (ref 4.2–5.8)
RBC # FLD: 12.5 % — SIGNIFICANT CHANGE UP (ref 10.3–14.5)
RBC CASTS # UR COMP ASSIST: >50 /HPF (ref 0–4)
SODIUM SERPL-SCNC: 141 MMOL/L — SIGNIFICANT CHANGE UP (ref 135–145)
SP GR SPEC: 1.01 — SIGNIFICANT CHANGE UP (ref 1.01–1.02)
UROBILINOGEN FLD QL: NEGATIVE MG/DL — SIGNIFICANT CHANGE UP
WBC # BLD: 8.65 K/UL — SIGNIFICANT CHANGE UP (ref 3.8–10.5)
WBC # FLD AUTO: 8.65 K/UL — SIGNIFICANT CHANGE UP (ref 3.8–10.5)
WBC UR QL: SIGNIFICANT CHANGE UP

## 2020-07-07 PROCEDURE — 74176 CT ABD & PELVIS W/O CONTRAST: CPT | Mod: 26

## 2020-07-07 PROCEDURE — 99284 EMERGENCY DEPT VISIT MOD MDM: CPT

## 2020-07-07 RX ORDER — ONDANSETRON 8 MG/1
1 TABLET, FILM COATED ORAL
Qty: 15 | Refills: 0
Start: 2020-07-07 | End: 2020-07-11

## 2020-07-07 RX ORDER — ONDANSETRON 8 MG/1
4 TABLET, FILM COATED ORAL ONCE
Refills: 0 | Status: COMPLETED | OUTPATIENT
Start: 2020-07-07 | End: 2020-07-07

## 2020-07-07 RX ORDER — SODIUM CHLORIDE 9 MG/ML
1000 INJECTION INTRAMUSCULAR; INTRAVENOUS; SUBCUTANEOUS ONCE
Refills: 0 | Status: COMPLETED | OUTPATIENT
Start: 2020-07-07 | End: 2020-07-07

## 2020-07-07 RX ORDER — OXYCODONE AND ACETAMINOPHEN 5; 325 MG/1; MG/1
1 TABLET ORAL
Qty: 18 | Refills: 0
Start: 2020-07-07 | End: 2020-07-09

## 2020-07-07 RX ORDER — KETOROLAC TROMETHAMINE 30 MG/ML
30 SYRINGE (ML) INJECTION ONCE
Refills: 0 | Status: DISCONTINUED | OUTPATIENT
Start: 2020-07-07 | End: 2020-07-07

## 2020-07-07 RX ORDER — MORPHINE SULFATE 50 MG/1
4 CAPSULE, EXTENDED RELEASE ORAL ONCE
Refills: 0 | Status: DISCONTINUED | OUTPATIENT
Start: 2020-07-07 | End: 2020-07-07

## 2020-07-07 RX ORDER — IBUPROFEN 200 MG
1 TABLET ORAL
Qty: 30 | Refills: 0
Start: 2020-07-07 | End: 2020-07-16

## 2020-07-07 RX ADMIN — SODIUM CHLORIDE 1000 MILLILITER(S): 9 INJECTION INTRAMUSCULAR; INTRAVENOUS; SUBCUTANEOUS at 02:27

## 2020-07-07 RX ADMIN — SODIUM CHLORIDE 1000 MILLILITER(S): 9 INJECTION INTRAMUSCULAR; INTRAVENOUS; SUBCUTANEOUS at 02:52

## 2020-07-07 RX ADMIN — Medication 30 MILLIGRAM(S): at 02:52

## 2020-07-07 RX ADMIN — MORPHINE SULFATE 4 MILLIGRAM(S): 50 CAPSULE, EXTENDED RELEASE ORAL at 02:53

## 2020-07-07 RX ADMIN — ONDANSETRON 4 MILLIGRAM(S): 8 TABLET, FILM COATED ORAL at 02:53

## 2020-07-07 NOTE — ED PROVIDER NOTE - OBJECTIVE STATEMENT
40 year old male that has a history of renal stones came to the ED because of right flank pain that radiates to the groin. The pain started suddenly tonight and is similar to previous renal stone. no vomiting, no hematuria, no urinary complaints, no barh, no sob, no chest pain.

## 2020-07-07 NOTE — ED ADULT TRIAGE NOTE - CHIEF COMPLAINT QUOTE
R- side pain radiating to R- groin and R- flank x two hours. Hx kidney stones states it feels like kidney stone again

## 2020-07-07 NOTE — CONSULT NOTE ADULT - ASSESSMENT
A/P: 41yo Male with PMH Hep B and Nephrolithiasis presents to the ER c/o Right flank pain radiating to the right groin since last night. Found to have a right UVJ stone with mild right hydro, per ER attending patient passed his stone while in the ER.   Surgery consulted for diffusely enlarged appendix measuring 10-11mm, compared to 6mm in 12/19/19.  Abdominal exam benign, and patient comfortable with no complaints of abdominal pain. No leukocytosis, afebrile.     As per Dr. Guzman, patient surgically stable for d/c, can follow up in the office for elective appendectomy.

## 2020-07-07 NOTE — ED PROVIDER NOTE - CLINICAL SUMMARY MEDICAL DECISION MAKING FREE TEXT BOX
Patient with right flank pain, suggestive of renal stone, labs and imaging noted. Seen by surgery because of enlarged appendix. Pt passed the stone here. Surgery recommends outpatient follow up return to ED if worse. Precautions reviewed, on re-exam, abd soft, nt.

## 2020-07-07 NOTE — ED PROVIDER NOTE - CARE PROVIDER_API CALL
Delmy Guzman  SURGERY  [address] [facility] [address] [place], [place] [address]  Phone: [phone]  Fax: [phone]  Follow Up Time: Urgent

## 2020-07-07 NOTE — ED PROVIDER NOTE - PATIENT PORTAL LINK FT
You can access the FollowMyHealth Patient Portal offered by [facility] by registering at the following website: [email or web address]. By joining [employer]’s FollowMyHealth portal, you will also be able to view your health information using other applications (apps) compatible with our system.

## 2020-07-07 NOTE — CONSULT NOTE ADULT - SUBJECTIVE AND OBJECTIVE BOX
GENERAL SURGERY CONSULT NOTE    Patient is a 40y old  Male who presents with a chief complaint of Right flank pain.    HPI: 39yo Male with PMH Hep B and Nephrolithiasis presents to the ER c/o Right flank pain radiating to the right groin since last night. Patient states pain woke him from sleep at around 11:30pm, pain described as severe and sharp, rated as 10/10 in severity with no known alleviating or known triggering event. States pain felt similar to previous h/o kidney stones, had 2 attacks in the past which resolved with conservative management.   After receiving morphine and Toradol in ER, patient states his pain improved, no complaints offered.   Denies fever/chills, chest pain, sob, cough, weakness, abdominal pain, N/V/C/D, change in BM or urinary symptoms.       PAST MEDICAL & SURGICAL HISTORY:  Kidney stones  Hepatitis B  Hepatitis B      Review of Systems:  Contained within HPI    MEDICATIONS: none    Allergies  No Known Allergies      SOCIAL HISTORY   Denies ETOH or tobacco use.     FAMILY HISTORY:  No pertinent family history in first degree relatives      Vital Signs Last 24 Hrs  T(C): 36.5 (2020 05:23), Max: 37 (2020 01:46)  T(F): 97.7 (2020 05:23), Max: 98.6 (2020 01:46)  HR: 55 (2020 05:23) (55 - 69)  BP: 111/70 (2020 05:23) (111/70 - 169/107)  RR: 16 (2020 05:23) (16 - 17)  SpO2: 98% (2020 05:23) (98% - 100%)    Physical Exam:    General:  Appears stated age, well-groomed, well-nourished, no distress  Eyes : QAMAR  HENT:  WNL, no JVD  Chest:  clear breath sounds  Cardiovascular: S1S2,  Regular rate & rhythm  Abdomen: Nondistended, +BS, soft, nontender, no guarding/rebound. Mild Right CVA tenderness.  Extremities:  Calf nontender b/l. 2+ peripheral pulses b/l.   Skin:  No rash  Musculoskeletal:  normal strength  Neuro/Psych:  Alert, oriented to time, place and person       LABS:                        13.9   8.65  )-----------( 232      ( 2020 03:11 )             42.8     07-07    141  |  109<H>  |  16  ----------------------------<  97  4.0   |  27  |  1.05    Ca    8.8      2020 03:11    TPro  7.7  /  Alb  3.9  /  TBili  0.3  /  DBili  x   /  AST  18  /  ALT  39  /  AlkPhos  72  -      Urinalysis Basic - ( 2020 05:31 )    Color: Yellow / Appearance: Clear / S.010 / pH: x  Gluc: x / Ketone: Negative  / Bili: Negative / Urobili: Negative mg/dL   Blood: x / Protein: Negative mg/dL / Nitrite: Negative   Leuk Esterase: Negative / RBC: >50 /HPF / WBC 3-5   Sq Epi: x / Non Sq Epi: Occasional / Bacteria: Few    RADIOLOGY & ADDITIONAL STUDIES:  < from: CT Renal Stone Hunt (20 @ 03:15) >  EXAM:  CT RENAL STONE HUNT                            PROCEDURE DATE:  2020          INTERPRETATION:  CLINICAL INFORMATION: Right flank pain.    COMPARISON: 2019.    PROCEDURE:   CT of the Abdomen and Pelvis was performed without intravenous contrast.   Intravenous contrast: None.  Oral contrast: None.  Sagittal and coronal reformats were performed.    FINDINGS:  LOWER CHEST: Within normal limits.    LIVER: Within normal limits.  BILE DUCTS: Normal caliber.  GALLBLADDER: Within normal limits.  SPLEEN: Within normal limits.  PANCREAS: Within normal limits.  ADRENALS: Within normal limits.  KIDNEYS/URETERS: Mild right hydronephrosis and hydroureter caused by a 3 mm stone impacted at the right ureterovesical junction.  Two additional nonobstructing left lower pole renal stones both measure less than 2 mm.    BLADDER: Within normal limits.  REPRODUCTIVE ORGANS: The prostate is normal in size, measuring approximately 3.6 x 4 x 3.6 cm.    BOWEL: No bowel obstruction.  The appendixis diffusely enlarged, measuring 10 mm proximally, 10 mm at its midportion and 11 mm distally; increased in size from 6 mm on 2019.  No significant periappendiceal inflammatory change or reactive thickening of the cecal base is appreciated. There is mild right-sided colonic diverticulosis without evidence for acute diverticulitis.  PERITONEUM: No ascites.  VESSELS: Within normal limits.  RETROPERITONEUM/LYMPH NODES: No lymphadenopathy.    ABDOMINAL WALL: Within normal limits.  BONES: Withinnormal limits.    IMPRESSION:   Mild right hydronephrosis and hydroureter caused by a 3 mm stone impacted at the right ureterovesical junction.  Additional nonobstructing left lower pole renal stones measure less than 2 mm.    Diffusely enlarged appendix measuring 10-11 mm, compared to 6 mm on 2019.  No significant periappendiceal inflammatory change or reactive thickening of the cecal base is appreciated.  Early acute appendicitis should be excluded based on evolution of the patient's symptoms and laboratory values.    I discussed the findings with Dr. Stephens on 2020 at 5:25 AM.    < end of copied text >

## 2020-07-08 LAB
CULTURE RESULTS: SIGNIFICANT CHANGE UP
SPECIMEN SOURCE: SIGNIFICANT CHANGE UP

## 2020-07-20 ENCOUNTER — EMERGENCY (EMERGENCY)
Facility: HOSPITAL | Age: 40
LOS: 0 days | Discharge: HOME | End: 2020-07-20
Payer: MEDICAID

## 2020-07-20 VITALS
OXYGEN SATURATION: 100 % | SYSTOLIC BLOOD PRESSURE: 148 MMHG | WEIGHT: 184.09 LBS | TEMPERATURE: 98 F | DIASTOLIC BLOOD PRESSURE: 86 MMHG | HEIGHT: 70 IN | RESPIRATION RATE: 16 BRPM | HEART RATE: 86 BPM

## 2020-07-20 DIAGNOSIS — B19.10 UNSPECIFIED VIRAL HEPATITIS B WITHOUT HEPATIC COMA: Chronic | ICD-10-CM

## 2020-07-20 PROCEDURE — 99283 EMERGENCY DEPT VISIT LOW MDM: CPT

## 2020-07-20 RX ORDER — IBUPROFEN 200 MG
600 TABLET ORAL ONCE
Refills: 0 | Status: COMPLETED | OUTPATIENT
Start: 2020-07-20 | End: 2020-07-20

## 2020-07-20 RX ORDER — CIPROFLOXACIN HCL 0.3 %
1 DROPS OPHTHALMIC (EYE) ONCE
Refills: 0 | Status: COMPLETED | OUTPATIENT
Start: 2020-07-20 | End: 2020-07-20

## 2020-07-20 RX ORDER — DEXAMETHASONE 0.5 MG/5ML
12 ELIXIR ORAL ONCE
Refills: 0 | Status: COMPLETED | OUTPATIENT
Start: 2020-07-20 | End: 2020-07-20

## 2020-07-20 RX ORDER — CIPROFLOXACIN HCL 0.3 %
1 DROPS OPHTHALMIC (EYE) ONCE
Refills: 0 | Status: DISCONTINUED | OUTPATIENT
Start: 2020-07-20 | End: 2020-07-20

## 2020-07-20 RX ORDER — TENOFOVIR DISOPROXIL FUMARATE 300 MG/1
1 TABLET, FILM COATED ORAL
Qty: 0 | Refills: 0 | DISCHARGE

## 2020-07-20 RX ADMIN — Medication 12 MILLIGRAM(S): at 21:16

## 2020-07-20 RX ADMIN — Medication 1 DROP(S): at 21:25

## 2020-07-20 NOTE — ED PROVIDER NOTE - CLINICAL SUMMARY MEDICAL DECISION MAKING FREE TEXT BOX
39 yo male with h/o kidney stones and hepatitis B presents to the ED c/o right ear pain x 4 days. Patient went to  and was prescribed polytrim and levaquin with minimal improvement. Patient also taking motrin for pain. Associated with mild headache and throat pain. Denies fever, chills, chest pain, sob, neck pain/stiffness, chest pain, sob, abd pain, N/V, UE/LE weakness or paresthesias. no h/o DM. no recent water exposure. + discharge from ear. no hearing changes. + swelling and discharge to right external canal, unable to visualize TM. no mastoid tenderness. Unable to find wick in ED, instead piece of crystal was placed. Added on Augmentin  and changed ear drops to cipro and decadron. Recommend motrin for pain. Recommend follow up with ENT. PAtient understands return precautions.

## 2020-07-20 NOTE — ED PROVIDER NOTE - OBJECTIVE STATEMENT
41 yo male with h/o kidney stones and hepatitis B presents to the ED c/o right ear pain x 4 days. Patient went to  and was prescribed polytrim and levaquin with minimal improvement. Patient also taking motrin for pain. Associated with mild headache and throat pain. Denies fever, chills, chest pain, sob, neck pain/stiffness, chest pain, sob, abd pain, N/V, UE/LE weakness or paresthesias. no h/o DM. no recent water exposure. + discharge from ear. no hearing changes

## 2020-07-20 NOTE — ED PROVIDER NOTE - CARE PROVIDER_API CALL
Blake Brambila  OTOLARYNGOLOGY  [address] [place] [address], [place] [address]  Phone: [phone]  Fax: [phone]  Follow Up Time: 1-3 Days

## 2020-07-20 NOTE — ED ADULT TRIAGE NOTE - CHIEF COMPLAINT QUOTE
R- ear pain, headache, difficulty swallowing, prescribed with antibiotics 2 days ago from urgent care

## 2020-07-20 NOTE — ED PROVIDER NOTE - RIGHT EAR
EXTERNAL CANAL INFLAMMATION/unable to visualize TM due to swelling and discharge. no mastoid tenderness. + TTP auricular lymph node./AURICULAR/TRAGAL TENDERNESS/DRAINAGE

## 2020-07-20 NOTE — ED ADULT NURSE NOTE - OBJECTIVE STATEMENT
patient received, came here for right ear pain x 2 days, stated antibiotic not working, getting worse. with headache and slight dizziness

## 2020-07-20 NOTE — ED PROVIDER NOTE - PATIENT PORTAL LINK FT
You can access the FollowMyHealth Patient Portal offered by [facility] by registering at the following website: [email or web address]. By joining [employer]’s FollowMyHealth portal, you will also be able to view your health information using other applications (apps) compatible with our system.

## 2020-07-21 DIAGNOSIS — Z86.19 PERSONAL HISTORY OF OTHER INFECTIOUS AND PARASITIC DISEASES: ICD-10-CM

## 2020-07-21 DIAGNOSIS — H60.91 UNSPECIFIED OTITIS EXTERNA, RIGHT EAR: ICD-10-CM

## 2020-07-21 DIAGNOSIS — R51 HEADACHE: ICD-10-CM

## 2020-07-21 DIAGNOSIS — H92.01 OTALGIA, RIGHT EAR: ICD-10-CM

## 2020-07-21 DIAGNOSIS — R07.0 PAIN IN THROAT: ICD-10-CM

## 2020-07-21 DIAGNOSIS — Z87.442 PERSONAL HISTORY OF URINARY CALCULI: ICD-10-CM

## 2020-12-08 NOTE — ED PROVIDER NOTE - NEURO NEGATIVE STATEMENT, MLM
DISCHARGE
no loss of consciousness, no gait abnormality, no headache, no sensory deficits, and no weakness.

## 2022-01-10 ENCOUNTER — TRANSCRIPTION ENCOUNTER (OUTPATIENT)
Age: 42
End: 2022-01-10

## 2022-07-13 NOTE — ED ADULT TRIAGE NOTE - HEART RATE (BEATS/MIN)
86 Calcipotriene Counseling: Cantharidin Counseling:  I discussed with the patient the risks of Cantharidin including but not limited to pain, redness, burning, itching, and blistering.

## 2022-07-25 NOTE — ED PROVIDER NOTE - NEUROLOGICAL POSTURING
PCP: Mau Potts MD    Last appt: 2/7/2022  No future appointments. Requested Prescriptions     Pending Prescriptions Disp Refills    levETIRAcetam (KEPPRA) 500 mg tablet 180 Tablet 1     Sig: Take 1 Tablet by mouth two (2) times a day. normal

## 2022-11-03 ENCOUNTER — EMERGENCY (EMERGENCY)
Facility: HOSPITAL | Age: 42
LOS: 0 days | Discharge: HOME | End: 2022-11-04
Attending: STUDENT IN AN ORGANIZED HEALTH CARE EDUCATION/TRAINING PROGRAM

## 2022-11-03 VITALS
RESPIRATION RATE: 18 BRPM | SYSTOLIC BLOOD PRESSURE: 167 MMHG | HEIGHT: 70 IN | DIASTOLIC BLOOD PRESSURE: 103 MMHG | OXYGEN SATURATION: 100 % | HEART RATE: 87 BPM | TEMPERATURE: 98 F | WEIGHT: 201.94 LBS

## 2022-11-03 DIAGNOSIS — Z20.822 CONTACT WITH AND (SUSPECTED) EXPOSURE TO COVID-19: ICD-10-CM

## 2022-11-03 DIAGNOSIS — R51.9 HEADACHE, UNSPECIFIED: ICD-10-CM

## 2022-11-03 DIAGNOSIS — B19.10 UNSPECIFIED VIRAL HEPATITIS B WITHOUT HEPATIC COMA: Chronic | ICD-10-CM

## 2022-11-03 DIAGNOSIS — Z87.442 PERSONAL HISTORY OF URINARY CALCULI: ICD-10-CM

## 2022-11-03 DIAGNOSIS — I10 ESSENTIAL (PRIMARY) HYPERTENSION: ICD-10-CM

## 2022-11-03 PROCEDURE — 99285 EMERGENCY DEPT VISIT HI MDM: CPT

## 2022-11-03 NOTE — ED ADULT TRIAGE NOTE - CHIEF COMPLAINT QUOTE
pt presents to the ED c/o high BP, headache and tingling sensation to left side of head started today in AM. pt states had headache on right side w/ watery eyes yesterday and went to urgent care and told to come to ED yesterday for high BP and went to PCP yesterday given amlodipine 5mg and told to take. denies: blurry vision

## 2022-11-04 VITALS
RESPIRATION RATE: 22 BRPM | SYSTOLIC BLOOD PRESSURE: 122 MMHG | TEMPERATURE: 98 F | OXYGEN SATURATION: 98 % | DIASTOLIC BLOOD PRESSURE: 79 MMHG | HEART RATE: 97 BPM

## 2022-11-04 PROBLEM — B19.10 UNSPECIFIED VIRAL HEPATITIS B WITHOUT HEPATIC COMA: Chronic | Status: ACTIVE | Noted: 2020-07-07

## 2022-11-04 PROBLEM — N20.0 CALCULUS OF KIDNEY: Chronic | Status: ACTIVE | Noted: 2020-07-07

## 2022-11-04 LAB
ALBUMIN SERPL ELPH-MCNC: 4.2 G/DL — SIGNIFICANT CHANGE UP (ref 3.3–5)
ALP SERPL-CCNC: 66 U/L — SIGNIFICANT CHANGE UP (ref 40–120)
ALT FLD-CCNC: 74 U/L — SIGNIFICANT CHANGE UP (ref 12–78)
ANION GAP SERPL CALC-SCNC: 5 MMOL/L — SIGNIFICANT CHANGE UP (ref 5–17)
AST SERPL-CCNC: 26 U/L — SIGNIFICANT CHANGE UP (ref 15–37)
BASOPHILS # BLD AUTO: 0.03 K/UL — SIGNIFICANT CHANGE UP (ref 0–0.2)
BASOPHILS NFR BLD AUTO: 0.4 % — SIGNIFICANT CHANGE UP (ref 0–2)
BILIRUB SERPL-MCNC: 0.4 MG/DL — SIGNIFICANT CHANGE UP (ref 0.2–1.2)
BUN SERPL-MCNC: 15 MG/DL — SIGNIFICANT CHANGE UP (ref 7–23)
CALCIUM SERPL-MCNC: 8.8 MG/DL — SIGNIFICANT CHANGE UP (ref 8.5–10.1)
CHLORIDE SERPL-SCNC: 106 MMOL/L — SIGNIFICANT CHANGE UP (ref 96–108)
CO2 SERPL-SCNC: 29 MMOL/L — SIGNIFICANT CHANGE UP (ref 22–31)
CREAT SERPL-MCNC: 0.89 MG/DL — SIGNIFICANT CHANGE UP (ref 0.5–1.3)
EGFR: 110 ML/MIN/1.73M2 — SIGNIFICANT CHANGE UP
EOSINOPHIL # BLD AUTO: 0.33 K/UL — SIGNIFICANT CHANGE UP (ref 0–0.5)
EOSINOPHIL NFR BLD AUTO: 4.2 % — SIGNIFICANT CHANGE UP (ref 0–6)
FLUAV AG NPH QL: SIGNIFICANT CHANGE UP
FLUBV AG NPH QL: SIGNIFICANT CHANGE UP
GLUCOSE SERPL-MCNC: 112 MG/DL — HIGH (ref 70–99)
HCT VFR BLD CALC: 46 % — SIGNIFICANT CHANGE UP (ref 39–50)
HGB BLD-MCNC: 15.2 G/DL — SIGNIFICANT CHANGE UP (ref 13–17)
IMM GRANULOCYTES NFR BLD AUTO: 0.4 % — SIGNIFICANT CHANGE UP (ref 0–0.9)
LYMPHOCYTES # BLD AUTO: 3.62 K/UL — HIGH (ref 1–3.3)
LYMPHOCYTES # BLD AUTO: 46.5 % — HIGH (ref 13–44)
MCHC RBC-ENTMCNC: 26.8 PG — LOW (ref 27–34)
MCHC RBC-ENTMCNC: 33 G/DL — SIGNIFICANT CHANGE UP (ref 32–36)
MCV RBC AUTO: 81 FL — SIGNIFICANT CHANGE UP (ref 80–100)
MONOCYTES # BLD AUTO: 0.62 K/UL — SIGNIFICANT CHANGE UP (ref 0–0.9)
MONOCYTES NFR BLD AUTO: 8 % — SIGNIFICANT CHANGE UP (ref 2–14)
NEUTROPHILS # BLD AUTO: 3.16 K/UL — SIGNIFICANT CHANGE UP (ref 1.8–7.4)
NEUTROPHILS NFR BLD AUTO: 40.5 % — LOW (ref 43–77)
NRBC # BLD: 0 /100 WBCS — SIGNIFICANT CHANGE UP (ref 0–0)
PLATELET # BLD AUTO: 245 K/UL — SIGNIFICANT CHANGE UP (ref 150–400)
POTASSIUM SERPL-MCNC: 3.4 MMOL/L — LOW (ref 3.5–5.3)
POTASSIUM SERPL-SCNC: 3.4 MMOL/L — LOW (ref 3.5–5.3)
PROT SERPL-MCNC: 7.4 GM/DL — SIGNIFICANT CHANGE UP (ref 6–8.3)
RBC # BLD: 5.68 M/UL — SIGNIFICANT CHANGE UP (ref 4.2–5.8)
RBC # FLD: 12.3 % — SIGNIFICANT CHANGE UP (ref 10.3–14.5)
SARS-COV-2 RNA SPEC QL NAA+PROBE: SIGNIFICANT CHANGE UP
SODIUM SERPL-SCNC: 140 MMOL/L — SIGNIFICANT CHANGE UP (ref 135–145)
WBC # BLD: 8.06 K/UL — SIGNIFICANT CHANGE UP (ref 3.8–10.5)
WBC # FLD AUTO: 8.06 K/UL — SIGNIFICANT CHANGE UP (ref 3.8–10.5)

## 2022-11-04 PROCEDURE — 70450 CT HEAD/BRAIN W/O DYE: CPT | Mod: 26,MA

## 2022-11-04 RX ORDER — IBUPROFEN 200 MG
1 TABLET ORAL
Qty: 20 | Refills: 0
Start: 2022-11-04 | End: 2022-11-08

## 2022-11-04 RX ORDER — TRAMADOL HYDROCHLORIDE 50 MG/1
1 TABLET ORAL
Qty: 16 | Refills: 0
Start: 2022-11-04 | End: 2022-11-07

## 2022-11-04 RX ORDER — TRAMADOL HYDROCHLORIDE 50 MG/1
50 TABLET ORAL ONCE
Refills: 0 | Status: DISCONTINUED | OUTPATIENT
Start: 2022-11-04 | End: 2022-11-04

## 2022-11-04 RX ORDER — IBUPROFEN 200 MG
600 TABLET ORAL ONCE
Refills: 0 | Status: COMPLETED | OUTPATIENT
Start: 2022-11-04 | End: 2022-11-04

## 2022-11-04 RX ADMIN — TRAMADOL HYDROCHLORIDE 50 MILLIGRAM(S): 50 TABLET ORAL at 03:56

## 2022-11-04 RX ADMIN — Medication 600 MILLIGRAM(S): at 09:54

## 2022-11-04 RX ADMIN — Medication 30 MILLILITER(S): at 09:52

## 2022-11-04 NOTE — ED ADULT NURSE NOTE - CINV DISCH TEACH PARTICIP
[Normal Growth] : growth [Normal Development] : development [Promoting Physical Activity] : promoting physical activity [] : The components of the vaccine(s) to be administered today are listed in the plan of care. The disease(s) for which the vaccine(s) are intended to prevent and the risks have been discussed with the caretaker.  The risks are also included in the appropriate vaccination information statements which have been provided to the patient's caregiver.  The caregiver has given consent to vaccinate. Patient

## 2022-11-04 NOTE — ED ADULT NURSE NOTE - OBJECTIVE STATEMENT
Covering primary RN Elena. A&ox4, c/o headache and high BP.  pt states headache and high BP and tingling sensation to left side of head started today in AM. pt states had headache on right side w/ watery eyes yesterday and went to urgent care yesterday and told to come to ED yesterday for high BP and went to PCP yesterday given amlodipine 5mg. pt states taking amlodipine, however states bp was still elevated.  denies: blurry vision, nausea, vomiting, weakness, chest pain, sob, fever, chills.

## 2022-11-04 NOTE — ED PROVIDER NOTE - OBJECTIVE STATEMENT
42 year old male recently diagnosed with HTN (started on amlodipine) presents today c/o headache which started yesterday, pt states that he was eating when he experienced severe pain to the right side of his head over his right which radiated into his right head associated with tearing in the right eye, pt found his bp to be elevated to 170/116, today pt had similar symptoms but his headache was located to his left head and again elevated bp, pt did go to his pmd and pt states that he was held in the office until his bp improved and started on amlodipine, pt came into due to headache and elevated bp (-) chest pain (-) sob (-) dizziness (-) nausea or vomiting (-) visual or speech changes

## 2022-11-04 NOTE — ED PROVIDER NOTE - PATIENT PORTAL LINK FT
You can access the FollowMyHealth Patient Portal offered by [facility] by registering at the following website: [email or web address]. By joining "[employer]’s FollowMyHealth portal, you will also be able to view your health information using other applications (apps) compatible with our system.

## 2022-11-04 NOTE — ED ADULT NURSE REASSESSMENT NOTE - NS ED NURSE REASSESS COMMENT FT1
pt alert and oriented x4. pt reports pain level of 4/10. pt vitals obtained and BP is 122/79. Provider spoke with pt at bedside and informed pt of d/c status. hep lock removed.

## 2022-11-04 NOTE — ED ADULT NURSE REASSESSMENT NOTE - NS ED NURSE REASSESS COMMENT FT1
pt requested bp to be repeated once before he leaves to go home. bp of 146/92 obtained. MD made aware and confirmed d/c as planned. pt made aware.

## 2022-11-04 NOTE — ED ADULT NURSE REASSESSMENT NOTE - NS ED NURSE REASSESS COMMENT FT1
MD made aware of [facility] lab value. pt dc home.  waiting at hospital entrance. pt dc paperwork reviewed and signed. hep lock removed. pt taken off CM. MD made aware of potassium lab value. pt dc home. pt dc paperwork reviewed and signed. hep lock removed. pt taken off CM.

## 2023-01-11 ENCOUNTER — APPOINTMENT (OUTPATIENT)
Dept: CARDIOLOGY | Facility: CLINIC | Age: 43
End: 2023-01-11
Payer: MEDICAID

## 2023-01-11 ENCOUNTER — NON-APPOINTMENT (OUTPATIENT)
Age: 43
End: 2023-01-11

## 2023-01-11 VITALS
SYSTOLIC BLOOD PRESSURE: 140 MMHG | HEART RATE: 90 BPM | HEIGHT: 70 IN | OXYGEN SATURATION: 97 % | WEIGHT: 196 LBS | BODY MASS INDEX: 28.06 KG/M2 | DIASTOLIC BLOOD PRESSURE: 84 MMHG

## 2023-01-11 VITALS — DIASTOLIC BLOOD PRESSURE: 86 MMHG | SYSTOLIC BLOOD PRESSURE: 144 MMHG

## 2023-01-11 DIAGNOSIS — Z86.19 PERSONAL HISTORY OF OTHER INFECTIOUS AND PARASITIC DISEASES: ICD-10-CM

## 2023-01-11 DIAGNOSIS — R51.9 HEADACHE, UNSPECIFIED: ICD-10-CM

## 2023-01-11 DIAGNOSIS — R94.39 ABNORMAL RESULT OF OTHER CARDIOVASCULAR FUNCTION STUDY: ICD-10-CM

## 2023-01-11 DIAGNOSIS — R29.818 OTHER SYMPTOMS AND SIGNS INVOLVING THE NERVOUS SYSTEM: ICD-10-CM

## 2023-01-11 DIAGNOSIS — Z86.79 PERSONAL HISTORY OF OTHER DISEASES OF THE CIRCULATORY SYSTEM: ICD-10-CM

## 2023-01-11 DIAGNOSIS — R07.89 OTHER CHEST PAIN: ICD-10-CM

## 2023-01-11 DIAGNOSIS — I10 ESSENTIAL (PRIMARY) HYPERTENSION: ICD-10-CM

## 2023-01-11 DIAGNOSIS — Z78.9 OTHER SPECIFIED HEALTH STATUS: ICD-10-CM

## 2023-01-11 PROBLEM — Z00.00 ENCOUNTER FOR PREVENTIVE HEALTH EXAMINATION: Status: ACTIVE | Noted: 2023-01-11

## 2023-01-11 PROCEDURE — 93015 CV STRESS TEST SUPVJ I&R: CPT

## 2023-01-11 PROCEDURE — 99204 OFFICE O/P NEW MOD 45 MIN: CPT | Mod: 25

## 2023-01-11 PROCEDURE — 93000 ELECTROCARDIOGRAM COMPLETE: CPT | Mod: 59

## 2023-01-11 RX ORDER — TENOFOVIR DISOPROXIL FUMARATE 300 MG/1
TABLET ORAL
Refills: 0 | Status: ACTIVE | COMMUNITY

## 2023-01-11 RX ORDER — AMLODIPINE BESYLATE 2.5 MG/1
2.5 TABLET ORAL DAILY
Qty: 90 | Refills: 3 | Status: ACTIVE | COMMUNITY
Start: 2023-01-11 | End: 1900-01-01

## 2023-01-11 NOTE — DISCUSSION/SUMMARY
[FreeTextEntry1] : Advised neuro eval if headaches persist.\par Add/rx amlodipine 2.5 mg QD, contin losartan, contin monitoring BP at home.\par Follow up 6 weeks.\par Check labs\par Stress test.\par Consider coronary calcium scoring.

## 2023-01-11 NOTE — HISTORY OF PRESENT ILLNESS
[FreeTextEntry1] : HEATHER VEE is a 42 year old male originally from [place], referred for evaluation of HTN, headaches and chest discomfort. Previously healthy and not checking BP.\par BP medication prescribed about 1-2 mo ago.  Was referred to ER from [facility] care for .  \par He checks BP occas at home and has been high 130s-140s/89-95.\par He works as [employer] .  Complains of bilat headache, and was associated with higher BP to 170 systolic.\par Headaches are constant, but have improved over the past 1-2 days.\par He complains of left sided chest heaviness that is constant for past couple of days. No ppt, exacerbating or alleviating factors.\par Not exercising.  He has not been going to gym lately due to bilat shoulder pains.\par No dyspnea.  \par Acc to wife he snores and sometimes when on his back appears to have apneic episodes.\par He notes daytime somnolence worse for past 2 weeks.\par \par FH - brother had CAD and  from MI age 49.  Father has angina.\par

## 2023-01-12 ENCOUNTER — NON-APPOINTMENT (OUTPATIENT)
Age: 43
End: 2023-01-12

## 2023-01-12 LAB
ALBUMIN SERPL ELPH-MCNC: 4.9 G/DL
ALP BLD-CCNC: 82 U/L
ALT SERPL-CCNC: 47 U/L
ANION GAP SERPL CALC-SCNC: 12 MMOL/L
AST SERPL-CCNC: 19 U/L
BASOPHILS # BLD AUTO: 0.04 K/UL
BASOPHILS NFR BLD AUTO: 0.4 %
BILIRUB SERPL-MCNC: 0.3 MG/DL
BUN SERPL-MCNC: 14 MG/DL
CALCIUM SERPL-MCNC: 9.7 MG/DL
CHLORIDE SERPL-SCNC: 102 MMOL/L
CHOLEST SERPL-MCNC: 177 MG/DL
CO2 SERPL-SCNC: 28 MMOL/L
CREAT SERPL-MCNC: 0.9 MG/DL
EGFR: 109 ML/MIN/1.73M2
EOSINOPHIL # BLD AUTO: 0.17 K/UL
EOSINOPHIL NFR BLD AUTO: 1.8 %
GLUCOSE SERPL-MCNC: 100 MG/DL
HCT VFR BLD CALC: 48.7 %
HDLC SERPL-MCNC: 38 MG/DL
HGB BLD-MCNC: 15.9 G/DL
IMM GRANULOCYTES NFR BLD AUTO: 0.4 %
LDLC SERPL CALC-MCNC: 96 MG/DL
LYMPHOCYTES # BLD AUTO: 3.38 K/UL
LYMPHOCYTES NFR BLD AUTO: 34.9 %
MAN DIFF?: NORMAL
MCHC RBC-ENTMCNC: 27 PG
MCHC RBC-ENTMCNC: 32.6 GM/DL
MCV RBC AUTO: 82.7 FL
MONOCYTES # BLD AUTO: 0.61 K/UL
MONOCYTES NFR BLD AUTO: 6.3 %
NEUTROPHILS # BLD AUTO: 5.45 K/UL
NEUTROPHILS NFR BLD AUTO: 56.2 %
NONHDLC SERPL-MCNC: 139 MG/DL
PLATELET # BLD AUTO: 297 K/UL
POTASSIUM SERPL-SCNC: 4.5 MMOL/L
PROT SERPL-MCNC: 7.6 G/DL
RBC # BLD: 5.89 M/UL
RBC # FLD: 13.1 %
SODIUM SERPL-SCNC: 142 MMOL/L
TRIGL SERPL-MCNC: 216 MG/DL
TSH SERPL-ACNC: 1.1 UIU/ML
WBC # FLD AUTO: 9.69 K/UL

## 2023-01-27 ENCOUNTER — APPOINTMENT (OUTPATIENT)
Dept: CARDIOLOGY | Facility: CLINIC | Age: 43
End: 2023-01-27
Payer: MEDICAID

## 2023-01-27 PROCEDURE — 93015 CV STRESS TEST SUPVJ I&R: CPT

## 2023-01-27 PROCEDURE — 95800 SLP STDY UNATTENDED: CPT

## 2023-01-27 PROCEDURE — 78452 HT MUSCLE IMAGE SPECT MULT: CPT

## 2023-01-27 PROCEDURE — A9500: CPT

## 2023-01-28 ENCOUNTER — FORM ENCOUNTER (OUTPATIENT)
Age: 43
End: 2023-01-28

## 2023-02-10 ENCOUNTER — NON-APPOINTMENT (OUTPATIENT)
Age: 43
End: 2023-02-10

## 2023-02-17 NOTE — ED ADULT NURSE NOTE - NS ED PATIENT SAFETY CONCERN
Hgb 9.0 unknown baseline,  MCV 94 2/2 to active hematemesis i/s/o known esophageal ulcer    - f/u iron studies  - trend CBC  - maintain active T&S (last (date))  - transfuse if Hgb <7 No

## 2023-03-09 ENCOUNTER — APPOINTMENT (OUTPATIENT)
Dept: PULMONOLOGY | Facility: CLINIC | Age: 43
End: 2023-03-09
Payer: MEDICAID

## 2023-03-09 DIAGNOSIS — G47.33 OBSTRUCTIVE SLEEP APNEA (ADULT) (PEDIATRIC): ICD-10-CM

## 2023-03-09 PROCEDURE — 99203 OFFICE O/P NEW LOW 30 MIN: CPT | Mod: 95

## 2023-03-09 NOTE — CONSULT LETTER
[Dear  ___] : Dear  [unfilled], [Consult Letter:] : I had the pleasure of evaluating your patient, [unfilled]. [( Thank you for referring [unfilled] for consultation for _____ )] : Thank you for referring [unfilled] for consultation for [unfilled] [Please see my note below.] : Please see my note below. [Consult Closing:] : Thank you very much for allowing me to participate in the care of this patient.  If you have any questions, please do not hesitate to contact me. [Sincerely,] : Sincerely, [FreeTextEntry2] : Dr. Magdiel Aguiar [FreeTextEntry3] : Page Patel MD, FAASM\par  of Medicine\par Associate , Fellowship in Pulmonary and Critical Care Medicine\par Division of Pulmonary, Critical Care & Sleep Medicine\par [employer] [facility]\par \par \par

## 2023-03-09 NOTE — ASSESSMENT
[FreeTextEntry1] : Mild JAQUELIN, very symptomatic and with comorbid HTN\par \par Plan:\par Explained the rationale for diagnosis and treatment of sleep apnea including its effect on quality of life and long term cardiovascular risk. \par Will order CPAP with supplies- [facility] as patient is a mouth breather.\par Patient instructed to call if he/she is not contacted by the "[employer] company in 4 weeks and to make a follow up appointment with me at 4-6 weeks after initiating therapy.\par Above discussed at length and he/she appears to understand.\par Weight loss was also recommended and discussed at length\par \par Will call the office with any issues or concerns as needed.\par \par

## 2023-03-09 NOTE — REVIEW OF SYSTEMS
[Nocturia] : nocturia [Difficulty Initiating Sleep] : difficulty falling asleep [Difficulty Maintaining Sleep] : no difficulty maintaining sleep [Lower Extremity Discomfort] : no lower extremity discomfort [Unusual Sleep Behavior] : no unusual sleep behavior [Hypersomnolence] : not sleeping much more than usual [Negative] : Psychiatric [FreeTextEntry3] : per HPI [FreeTextEntry8] : per HPI [FreeTextEntry9] : per HPI

## 2023-03-09 NOTE — HISTORY OF PRESENT ILLNESS
[Obstructive Sleep Apnea] : obstructive sleep apnea [FreeTextEntry1] : 41 yo M presents for initial evaluation of sleep disordered breathing\par Referred by Dr. Magdiel Aguiar. \par Accompanied by his wife Tanja\par PMH: HTN\par Watchpat HST 1/29/23: pAHI 2.2, RDI 8.2/hr\par \par Sleep history: \par Main complaints of nonrestorative sleep, severe snoring and daytime somnolence. Wife has witnessed occasional gasping/choking episodes\par Bed: 10-11 pm, Takes a while to fall asleep, has "a lot on his mind", wakes 1x to urinate, out of bed at 4:30-5 am \par Unrefreshed upon awakening. \par Morning headaches: yes\par Dry mouth/throat: yes\par Weight trend: stable\par Works as an [employer]  - denies drowsy driving, denies any accidents or near accidents. \par EDS with ESS of 12\par \par Quality Metrics:\par Smoking history: denies\par ESS: 12\par \par Vaccines: \par COVID: x2\par Influenza: did not receive\par Pneumococcal: NA\par \par Weight 206, height 5 10 - BMI 29\par

## 2023-07-18 ENCOUNTER — RX RENEWAL (OUTPATIENT)
Age: 43
End: 2023-07-18

## 2023-07-18 RX ORDER — LOSARTAN POTASSIUM 50 MG/1
50 TABLET, FILM COATED ORAL
Qty: 90 | Refills: 1 | Status: ACTIVE | COMMUNITY
Start: 2023-07-18 | End: 1900-01-01

## 2023-11-27 NOTE — ED ADULT NURSE NOTE - NS ED NURSE RECORD ANOTHER HT AND WT
Called to discuss results with patient. Minimal improvement w Augmentin therapy although does note discomfort has improved. Declined ordering CT Neck at this time d/t schedule conflicts this week. Has appt w ENT on 11/30 and would like to follow up with them before any additional imaging is ordered.   No

## 2024-09-05 NOTE — ED ADULT NURSE NOTE - BREATH SOUNDS, MLM
Refill Request - Controlled Substance    CONFIRM preferred pharmacy with the patient.    If Mail Order Rx - Pend for 90 day refill.        Last Seen Department: 3/28/2024  Last Seen by PCP: 3/28/2024    Last Written: 8/7/24 60 with no refills     Last UDS: 2/2/23     Med Agreement Signed On: 2/3/23    If no future appointment scheduled:  Review the last OV with PCP and review information for follow-up visit,  Route STAFF MESSAGE with patient name to the  Pool for scheduling with the following information:            -  Timing of next visit           -  Visit type ie Physical, OV, etc           -  Diagnoses/Reason ie. COPD, HTN - Do not use MEDICATION, Follow-up or CHECK UP - Give reason for visit        Next Appointment:   Future Appointments   Date Time Provider Department Center   9/30/2024  3:00 PM Yamila Carmona PA [facility] DE[facility]       Message sent to  to schedule appt with patient?  NO      Requested Prescriptions     Pending Prescriptions Disp Refills    amphetamine-dextroamphetamine (ADDERALL XR) 10 MG extended release capsule 60 capsule 0     Sig: Take 2 capsules by mouth every morning for 30 days. Max Daily Amount: 20 mg         
Clear

## 2024-11-12 ENCOUNTER — APPOINTMENT (OUTPATIENT)
Dept: ORTHOPEDIC SURGERY | Facility: CLINIC | Age: 44
End: 2024-11-12

## 2024-12-13 ENCOUNTER — APPOINTMENT (OUTPATIENT)
Dept: ORTHOPEDIC SURGERY | Facility: CLINIC | Age: 44
End: 2024-12-13
Payer: MEDICAID

## 2024-12-13 VITALS — WEIGHT: 192 LBS | HEIGHT: 70 IN | BODY MASS INDEX: 27.49 KG/M2

## 2024-12-13 DIAGNOSIS — G57.01 LESION OF SCIATIC NERVE, RIGHT LOWER LIMB: ICD-10-CM

## 2024-12-13 DIAGNOSIS — M16.11 UNILATERAL PRIMARY OSTEOARTHRITIS, RIGHT HIP: ICD-10-CM

## 2024-12-13 PROCEDURE — 72100 X-RAY EXAM L-S SPINE 2/3 VWS: CPT

## 2024-12-13 PROCEDURE — 99203 OFFICE O/P NEW LOW 30 MIN: CPT | Mod: 25

## 2024-12-13 PROCEDURE — 73502 X-RAY EXAM HIP UNI 2-3 VIEWS: CPT | Mod: RT

## 2024-12-18 ENCOUNTER — OUTPATIENT (OUTPATIENT)
Dept: OUTPATIENT SERVICES | Facility: HOSPITAL | Age: 44
LOS: 1 days | End: 2024-12-18
Payer: MEDICAID

## 2024-12-18 ENCOUNTER — APPOINTMENT (OUTPATIENT)
Dept: ULTRASOUND IMAGING | Facility: CLINIC | Age: 44
End: 2024-12-18
Payer: MEDICAID

## 2024-12-18 DIAGNOSIS — B19.10 UNSPECIFIED VIRAL HEPATITIS B WITHOUT HEPATIC COMA: Chronic | ICD-10-CM

## 2024-12-18 DIAGNOSIS — G57.01 LESION OF SCIATIC NERVE, RIGHT LOWER LIMB: ICD-10-CM

## 2024-12-18 PROCEDURE — 20611 DRAIN/INJ JOINT/BURSA W/US: CPT

## 2024-12-18 PROCEDURE — 20611 DRAIN/INJ JOINT/BURSA W/US: CPT | Mod: RT

## 2025-01-02 NOTE — ED ADULT NURSE NOTE - DOES PATIENT HAVE ADVANCE DIRECTIVE
Inpatient consult to Intensivist  Consult performed by: Raulito Cruz MD  Consult ordered by: Fang Landis DO          History Of Present Illness  Negin is a 68 year old female with recurrent admissions to the hospital with respiratory issues, comes to the emergency room today with complaints of shortness of breath.  She was in the emergency room yesterday with leg swelling and did not have shortness of breath at that time.  She states that she feels like she has pneumonia similar to prior episodes.  Each admission she is treated with antibiotics and has multiple antibiotic allergies.    Tonight in the emergency room she initially appeared comfortable with nasal cannula oxygen but after a long nebulizer treatment her breathing seemed to worsen.  She was placed on BiPAP and therefore intensivist was called for evaluation for ICU admission.  A venous blood gas has a pH of 7.34 with a pCO2 of 52.  She is now on BiPAP 10/5 with FiO2 45%.  Her saturation is 97%, but there is increased work of breathing.  She is sleepy but arousable to follow commands.      Past Medical History  Past Medical History:   Diagnosis Date    Acquired hypothyroidism 06/03/2020    Anticoagulated on Coumadin 06/03/2020    Atrial fibrillation  (CMD)     Eliquis    Cardiac pacemaker in situ 2022    Cellulitis in diabetic foot  (CMD) 05/19/2023    Cellulitis LE     resolved -- scar on back of right thigh from previous healed pressure sore    Cellulitis of right lower leg 02/23/2022    Cerebral infarction (CMD) 07/03/2024    per pt, left facial droop and slurred speach started on 7/3/2024, admitted to hospital 7/5 and small CVA noted on MRI by cardiology, see notes    Chronic anemia 06/03/2020    Chronic deep vein thrombosis (DVT) of distal vein of lower extremity  (CMD) 06/03/2020    Chronic diastolic congestive heart failure  (CMD) 02/17/2023    Chronic obstructive pulmonary disease  (CMD) 09/16/2022    Chronic pain syndrome 06/03/2020     COPD (chronic obstructive pulmonary disease)  (CMD)     Depression     Diabetes mellitus with coincident hypertension  (CMD) 06/03/2020    Diabetes mellitus, type 2  (CMD)     IDDM    Diabetic neuropathy  (CMD) 04/12/2022    DVT, RLE (deep venous thrombosis)  (CMD) 1994    Encephalopathy, unspecified 05/27/2020    Essential (primary) hypertension     Essential hypertension, malignant 07/19/2004    Gastro-esophageal reflux disease with esophagitis, without bleeding 04/12/2022    High cholesterol     Hypothyroidism     Major depressive disorder, single episode, mild (CMD) 06/03/2020    Methicillin resistant Staphylococcus aureus infection as the cause of diseases classified elsewhere 05/09/2022    Mild intermittent asthma with (acute) exacerbation (CMD) 09/16/2022    Morbid (severe) obesity due to excess calories  (CMD) 05/27/2020    Need for assistance with personal care 05/20/2022    Neuropathy     bilateral feet & hands    Obstructive sleep apnea 07/19/2004    last test 2001/// ++ restless leg syndrome,improved with iron replacement      Opioid dependence, uncomplicated  (CMD) 04/12/2022    Osteoarthritis of lumbar spine 01/15/2019    Parkinson's disease  (CMD) 09/16/2022    Paroxysmal atrial fibrillation  (CMD) 05/09/2022    Personal history of pulmonary embolism 06/03/2020    Pneumonia due to infectious organism, unspecified laterality, unspecified part of lung 11/01/2024    Presence of cardiac pacemaker 11/21/2022    Primary osteoarthritis of both knees 01/15/2019    Pulmonary embolism (CMD) 1994    Pure hypercholesterolemia 07/19/2004    Restless legs syndrome (RLS) 05/14/2007    resolved with iron replacement 5-07      Retention of urine 04/12/2022    Sepsis, unspecified organism  (CMD) 05/27/2020    Severe protein-calorie malnutrition (Chaudhari: less than 60% of standard weight)  (CMD) 05/20/2022    Stage 3 chronic kidney disease  (CMD) 08/04/2022    Type 2 diabetes mellitus with stage 3a chronic kidney  disease, with long-term current use of insulin  (CMD) 06/05/2020    Type 2 diabetes mellitus without complication  (CMD) 06/05/2020    Unspecified right bundle-branch block 05/20/2022    Urinary incontinence     UTI (urinary tract infection)     RESOLVED        Surgical History  Past Surgical History:   Procedure Laterality Date    Back surgery      Discectomy    Pacemaker implant  2022    [employer]    Repair of ureter  1994    Repair umbilical anand,<4y/o,reduc      Service to gastroenterology      Tonsillectomy          Medications  (Not in a hospital admission)    Current Facility-Administered Medications   Medication Dose Route Frequency Provider Last Rate Last Admin    doxycycline (VIBRAMYCIN) 100 mg in sodium chloride 0.9 % 100 mL IVPB  100 mg Intravenous Once Fang Landis  mL/hr at 01/01/25 2331 100 mg at 01/01/25 2331       Social History  Social History     Tobacco Use    Smoking status: Former     Types: Cigarettes    Smokeless tobacco: Never   Vaping Use    Vaping status: Some Days    Substances: Nicotine    Passive vaping exposure: Yes   Substance Use Topics    Alcohol use: Not Currently    Drug use: Never       Family History    Family History   Problem Relation Age of Onset    Goiter Mother     Patient is unaware of any medical problems Father     Cancer Daughter         thyroid cancer/hoshimotos    Goiter Maternal Grandmother         Allergies  ALLERGIES:  Ace inhibitors, Ampicillin-sulbactam sodium, Cefazolin, Ceftriaxone, Contrast media, Nitrofurantoin, Penicillins, Piperacillin sod-tazobactam so, Acetaminophen-codeine, Pioglitazone, Tramadol, and Tramadol hcl        Review of Systems  Unable due to condition.    Last Recorded Vitals  Blood pressure (!) 158/57, pulse 63, temperature 98.8 °F (37.1 °C), temperature source Oral, resp. rate 16, weight (!) 158 kg (348 lb 5.2 oz), SpO2 97%, not currently breastfeeding.     Physical Exam  Genl: Lethargic, arousable, on BiPAP, increased work of  breathing, hemodynamically stable  Skin:  Warm and dry without rash.    Head:  Normocephalic-atraumatic.   Neck:  Trachea is midline. No adenopathy.   Eyes:  Normal conjunctivae and sclerae.  Pupils equal, round, reactive to light.  Extraocular movements intact.    ENT:   Mucous membranes are moist.  No pharyngeal erythema or exudate.  No facial tenderness.  Cardiovascular:  Symmetrical pulses.  Regular rate and rhythm without murmur.  Respiratory:   Prolonged expiratory phase, expiratory wheezes bilaterally.  Gastrointestinal:  Soft and nontender.  Normal bowel sounds.  No hepatomegaly or splenomegaly.  No guarding or rebound tenderness.  No masses.  Morbidly obese.  Musculoskeletal:  No deformity or edema.  No tenderness to palpation.   Neurologic:   Lethargic but arousable.  Follows commands with hands and feet.      Labs  Recent Labs   Lab 01/01/25 2057 12/30/24  1859   SODIUM 137 137   POTASSIUM 5.0 4.6   CHLORIDE 110 106   CO2 25 27   BUN 29* 26*   CREATININE 1.46* 1.32*   CALCIUM 8.8 8.9   ALBUMIN 3.1* 3.1*   BILIRUBIN 0.3 0.2   ALKPT 129* 121*   GPT 14 12   AST 19 9   GLUCOSE 172* 178*      Recent Labs   Lab 01/01/25 2057 12/30/24  1859   WBC 5.1 5.6   RBC 3.14* 3.12*   HGB 8.7* 8.6*   HCT 28.4* 28.1*    197      Lab Results   Component Value Date    FERR 84 12/15/2024    FERR 239 05/27/2020    DDIMER 1.29 (H) 12/13/2024    DDIMER 1.11 (H) 05/23/2018       Encounter Date: 01/01/25   Electrocardiogram 12-Lead   Result Value    Ventricular Rate EKG/Min (BPM) 61    Atrial Rate (BPM) 61    QRS-Interval (MSEC) 142    QT-Interval (MSEC) 448    QTc 451    R Axis (Degrees) 39    T Axis (Degrees) -19    REPORT TEXT      Atrial-paced rhythm  with prolonged AV conduction  Right bundle branch block  T wave abnormality, consider lateral ischemia  Abnormal ECG  When compared with ECG of  15-DEC-2024 01:43,  Electronic atrial pacemaker  has replaced  Electronic ventricular pacemaker            Today's imaging:  XR  CHEST PA OR AP 1 VIEW    Result Date: 1/1/2025  EXAM: XR CHEST AP OR PA CLINICAL INDICATION: Shortness of breath. COMPARISON: Portable AP upright chest 12/13/2024 at 9:26 a.m.     FINDINGS/IMPRESSION: This AP portable upright chest is markedly limited due to patient body habitus. Cardiomegaly is noted with prominence of the pulmonary vascularity. The left lung is clear of acute consolidation. The left-sided pacemaker is unchanged in position. Area of early infiltrate is seen at the right lung base with a suggestion of a right pleural effusion which could be subpulmonic given the elevation of the right hemidiaphragm. Electronically Signed by: DORIS DELEON M.D. Signed on: 1/1/2025 9:41 PM Workstation ID: [id number]        Assessment and Plan    Acute on chronic hypoxic and hypercapnic respiratory failure  Possible pneumonia, many antibiotic allergies  History of A-fib and DVT, on Eliquis  Diabetes  Chronic pacemaker  Morbid obesity    BiPAP for respiratory muscle support  Arterial blood gas now  Steroids  Nebulized bronchodilators  Antibiotics while cultures pending  Check procalcitonin in the morning  ICU prophylaxis  Monitor in ICU for worsening respiratory status needing intubation  Critical care time 45 minutes    Raulito Cruz MD   01/01/25      No

## 2025-02-13 NOTE — ED ADULT NURSE NOTE - NSIMPLEMENTINTERV_GEN_ALL_ED
Will repeat thyroid ultrasound later this year and if still stable she will not need any further imaging   Implemented All Universal Safety Interventions:  [place] to call system. Call bell, personal items and telephone within reach. Instruct patient to call for assistance. Room bathroom lighting operational. Non-slip footwear when patient is off stretcher. Physically safe environment: no spills, clutter or unnecessary equipment. Stretcher in lowest position, wheels locked, appropriate side rails in place.

## 2025-04-30 ENCOUNTER — APPOINTMENT (OUTPATIENT)
Dept: NEUROLOGY | Facility: CLINIC | Age: 45
End: 2025-04-30

## 2025-05-04 ENCOUNTER — EMERGENCY (EMERGENCY)
Facility: HOSPITAL | Age: 45
LOS: 1 days | Discharge: HOME | End: 2025-05-06
Attending: STUDENT IN AN ORGANIZED HEALTH CARE EDUCATION/TRAINING PROGRAM | Admitting: INTERNAL MEDICINE
Payer: MEDICAID

## 2025-05-04 VITALS
OXYGEN SATURATION: 100 % | SYSTOLIC BLOOD PRESSURE: 148 MMHG | HEIGHT: 70 IN | WEIGHT: 174.83 LBS | TEMPERATURE: 99 F | HEART RATE: 100 BPM | DIASTOLIC BLOOD PRESSURE: 98 MMHG | RESPIRATION RATE: 16 BRPM

## 2025-05-04 DIAGNOSIS — R20.0 ANESTHESIA OF SKIN: ICD-10-CM

## 2025-05-04 DIAGNOSIS — Z87.442 PERSONAL HISTORY OF URINARY CALCULI: ICD-10-CM

## 2025-05-04 DIAGNOSIS — Z86.19 PERSONAL HISTORY OF OTHER INFECTIOUS AND PARASITIC DISEASES: ICD-10-CM

## 2025-05-04 DIAGNOSIS — I10 ESSENTIAL (PRIMARY) HYPERTENSION: ICD-10-CM

## 2025-05-04 DIAGNOSIS — B19.10 UNSPECIFIED VIRAL HEPATITIS B WITHOUT HEPATIC COMA: Chronic | ICD-10-CM

## 2025-05-04 LAB
ALBUMIN SERPL ELPH-MCNC: 3.7 G/DL — SIGNIFICANT CHANGE UP (ref 3.3–5)
ALP SERPL-CCNC: 73 U/L — SIGNIFICANT CHANGE UP (ref 40–120)
ALT FLD-CCNC: 46 U/L — SIGNIFICANT CHANGE UP (ref 12–78)
ANION GAP SERPL CALC-SCNC: 4 MMOL/L — LOW (ref 5–17)
APTT BLD: 31.6 SEC — SIGNIFICANT CHANGE UP (ref 26.1–36.8)
AST SERPL-CCNC: 26 U/L — SIGNIFICANT CHANGE UP (ref 15–37)
BASOPHILS # BLD AUTO: 0.02 K/UL — SIGNIFICANT CHANGE UP (ref 0–0.2)
BASOPHILS NFR BLD AUTO: 0.3 % — SIGNIFICANT CHANGE UP (ref 0–2)
BILIRUB SERPL-MCNC: 0.6 MG/DL — SIGNIFICANT CHANGE UP (ref 0.2–1.2)
BUN SERPL-MCNC: 11 MG/DL — SIGNIFICANT CHANGE UP (ref 7–23)
CALCIUM SERPL-MCNC: 8.7 MG/DL — SIGNIFICANT CHANGE UP (ref 8.5–10.1)
CHLORIDE SERPL-SCNC: 104 MMOL/L — SIGNIFICANT CHANGE UP (ref 96–108)
CO2 SERPL-SCNC: 29 MMOL/L — SIGNIFICANT CHANGE UP (ref 22–31)
CREAT SERPL-MCNC: 0.92 MG/DL — SIGNIFICANT CHANGE UP (ref 0.5–1.3)
EGFR: 105 ML/MIN/1.73M2 — SIGNIFICANT CHANGE UP
EGFR: 105 ML/MIN/1.73M2 — SIGNIFICANT CHANGE UP
EOSINOPHIL # BLD AUTO: 0.19 K/UL — SIGNIFICANT CHANGE UP (ref 0–0.5)
EOSINOPHIL NFR BLD AUTO: 2.9 % — SIGNIFICANT CHANGE UP (ref 0–6)
GLUCOSE SERPL-MCNC: 102 MG/DL — HIGH (ref 70–99)
HCT VFR BLD CALC: 43.4 % — SIGNIFICANT CHANGE UP (ref 39–50)
HGB BLD-MCNC: 14.6 G/DL — SIGNIFICANT CHANGE UP (ref 13–17)
IMM GRANULOCYTES NFR BLD AUTO: 0.3 % — SIGNIFICANT CHANGE UP (ref 0–0.9)
INR BLD: 1.17 RATIO — HIGH (ref 0.85–1.16)
LYMPHOCYTES # BLD AUTO: 2.19 K/UL — SIGNIFICANT CHANGE UP (ref 1–3.3)
LYMPHOCYTES # BLD AUTO: 33.3 % — SIGNIFICANT CHANGE UP (ref 13–44)
MCHC RBC-ENTMCNC: 27.3 PG — SIGNIFICANT CHANGE UP (ref 27–34)
MCHC RBC-ENTMCNC: 33.6 G/DL — SIGNIFICANT CHANGE UP (ref 32–36)
MCV RBC AUTO: 81.1 FL — SIGNIFICANT CHANGE UP (ref 80–100)
MONOCYTES # BLD AUTO: 0.76 K/UL — SIGNIFICANT CHANGE UP (ref 0–0.9)
MONOCYTES NFR BLD AUTO: 11.6 % — SIGNIFICANT CHANGE UP (ref 2–14)
NEUTROPHILS # BLD AUTO: 3.39 K/UL — SIGNIFICANT CHANGE UP (ref 1.8–7.4)
NEUTROPHILS NFR BLD AUTO: 51.6 % — SIGNIFICANT CHANGE UP (ref 43–77)
NRBC BLD AUTO-RTO: 0 /100 WBCS — SIGNIFICANT CHANGE UP (ref 0–0)
PLATELET # BLD AUTO: 200 K/UL — SIGNIFICANT CHANGE UP (ref 150–400)
POTASSIUM SERPL-MCNC: 3.5 MMOL/L — SIGNIFICANT CHANGE UP (ref 3.5–5.3)
POTASSIUM SERPL-SCNC: 3.5 MMOL/L — SIGNIFICANT CHANGE UP (ref 3.5–5.3)
PROT SERPL-MCNC: 7.9 GM/DL — SIGNIFICANT CHANGE UP (ref 6–8.3)
PROTHROM AB SERPL-ACNC: 13.2 SEC — SIGNIFICANT CHANGE UP (ref 9.9–13.4)
RBC # BLD: 5.35 M/UL — SIGNIFICANT CHANGE UP (ref 4.2–5.8)
RBC # FLD: 12 % — SIGNIFICANT CHANGE UP (ref 10.3–14.5)
SODIUM SERPL-SCNC: 137 MMOL/L — SIGNIFICANT CHANGE UP (ref 135–145)
TROPONIN I, HIGH SENSITIVITY RESULT: <3 NG/L — SIGNIFICANT CHANGE UP
WBC # BLD: 6.73 K/UL — SIGNIFICANT CHANGE UP (ref 3.8–10.5)
WBC # FLD AUTO: 6.73 K/UL — SIGNIFICANT CHANGE UP (ref 3.8–10.5)

## 2025-05-04 PROCEDURE — 99285 EMERGENCY DEPT VISIT HI MDM: CPT

## 2025-05-04 PROCEDURE — 70496 CT ANGIOGRAPHY HEAD: CPT | Mod: 26

## 2025-05-04 PROCEDURE — 0042T: CPT

## 2025-05-04 PROCEDURE — 71045 X-RAY EXAM CHEST 1 VIEW: CPT | Mod: 26

## 2025-05-04 PROCEDURE — 70498 CT ANGIOGRAPHY NECK: CPT | Mod: 26

## 2025-05-04 PROCEDURE — 70450 CT HEAD/BRAIN W/O DYE: CPT | Mod: 26,59

## 2025-05-04 PROCEDURE — 99282 EMERGENCY DEPT VISIT SF MDM: CPT

## 2025-05-04 RX ORDER — TENOFOVIR DISOPROXIL FUMARATE 300 MG/1
0 TABLET, FILM COATED ORAL
Refills: 0 | DISCHARGE

## 2025-05-04 RX ORDER — OSELTAMIVIR PHOSPHATE 75 MG/1
75 CAPSULE ORAL
Refills: 0 | Status: DISCONTINUED | OUTPATIENT
Start: 2025-05-04 | End: 2025-05-06

## 2025-05-04 RX ORDER — ATORVASTATIN CALCIUM 80 MG/1
40 TABLET, FILM COATED ORAL AT BEDTIME
Refills: 0 | Status: DISCONTINUED | OUTPATIENT
Start: 2025-05-04 | End: 2025-05-06

## 2025-05-04 RX ORDER — TENOFOVIR DISOPROXIL FUMARATE 300 MG/1
300 TABLET, FILM COATED ORAL DAILY
Refills: 0 | Status: DISCONTINUED | OUTPATIENT
Start: 2025-05-04 | End: 2025-05-06

## 2025-05-04 RX ORDER — ASPIRIN 325 MG
81 TABLET ORAL DAILY
Refills: 0 | Status: DISCONTINUED | OUTPATIENT
Start: 2025-05-04 | End: 2025-05-06

## 2025-05-04 RX ORDER — ACETAMINOPHEN 500 MG/5ML
650 LIQUID (ML) ORAL EVERY 6 HOURS
Refills: 0 | Status: DISCONTINUED | OUTPATIENT
Start: 2025-05-04 | End: 2025-05-06

## 2025-05-04 RX ADMIN — ATORVASTATIN CALCIUM 40 MILLIGRAM(S): 80 TABLET, FILM COATED ORAL at 22:56

## 2025-05-04 RX ADMIN — Medication 650 MILLIGRAM(S): at 22:56

## 2025-05-04 NOTE — ED PROVIDER NOTE - NSICDXPASTSURGICALHX_GEN_ALL_CORE_FT
Detail Level: Generalized
Quality 265: Biopsy Follow-Up: Biopsy results reviewed, communicated, tracked, and documented
PAST SURGICAL HISTORY:  Hepatitis B

## 2025-05-04 NOTE — ED ADULT NURSE NOTE - OBJECTIVE STATEMENT
L facial numbness since 4pm today . Hx Hepatitis B, HTN. Patient states that he had the flu on tuesday. Denies slurred speech, weakness.

## 2025-05-04 NOTE — ED PROVIDER NOTE - PROGRESS NOTE DETAILS
Evgeny DO: Patient reassessed at bedside and continues to have the same symptoms. Spoke with telestroke PA who is not recommending TNK for patient's current symptoms.

## 2025-05-04 NOTE — ED ADULT NURSE NOTE - NSFALLUNIVINTERV_ED_ALL_ED
Bed/Stretcher in lowest position, wheels locked, appropriate side rails in place/Call bell, personal items and telephone in reach/Instruct patient to call for assistance before getting out of bed/chair/stretcher/Non-slip footwear applied when patient is off stretcher/[place] to call system/Physically safe environment - no spills, clutter or unnecessary equipment/Purposeful proactive rounding/Room/bathroom lighting operational, light cord in reach

## 2025-05-04 NOTE — H&P ADULT - NSHPPHYSICALEXAM_GEN_ALL_CORE
PHYSICAL EXAMINATION:  Vital Signs Last 24 Hrs  T(C): 37.2 (04 May 2025 16:20), Max: 37.2 (04 May 2025 16:20)  T(F): 98.9 (04 May 2025 16:20), Max: 98.9 (04 May 2025 16:20)  HR: 100 (04 May 2025 16:20) (100 - 100)  BP: 148/98 (04 May 2025 16:20) (148/98 - 148/98)  BP(mean): --  RR: 16 (04 May 2025 16:20) (16 - 16)  SpO2: 100% (04 May 2025 16:20) (100% - 100%)    Parameters below as of 04 May 2025 16:20  Patient On (Oxygen Delivery Method): room air      CAPILLARY BLOOD GLUCOSE      POCT Blood Glucose.: 116 mg/dL (04 May 2025 16:26)      GENERAL: NAD,   ENMT: mucous membranes moist  NECK: supple, No JVD  CHEST/LUNG: clear to auscultation bilaterally; no rales, rhonchi, or wheezing b/l  HEART: normal S1, S2  ABDOMEN: BS+, soft, ND, NT   EXTREMITIES:  pulses palpable; no clubbing, cyanosis, or edema b/l LEs  NEURO: awake, alert, interactive; moves all extremities PHYSICAL EXAMINATION:  Vital Signs Last 24 Hrs  T(C): 37.2 (04 May 2025 16:20), Max: 37.2 (04 May 2025 16:20)  T(F): 98.9 (04 May 2025 16:20), Max: 98.9 (04 May 2025 16:20)  HR: 100 (04 May 2025 16:20) (100 - 100)  BP: 148/98 (04 May 2025 16:20) (148/98 - 148/98)  BP(mean): --  RR: 16 (04 May 2025 16:20) (16 - 16)  SpO2: 100% (04 May 2025 16:20) (100% - 100%)    Parameters below as of 04 May 2025 16:20  Patient On (Oxygen Delivery Method): room air      CAPILLARY BLOOD GLUCOSE      POCT Blood Glucose.: 116 mg/dL (04 May 2025 16:26)      GENERAL: NAD, seen in ER, speech fluent, mild left facial numbness, full MP both UE and LE 5/5, no aphasia  ENMT: mucous membranes moist  NECK: supple, No JVD  CHEST/LUNG: clear to auscultation bilaterally; no rales, rhonchi, or wheezing b/l  HEART: normal S1, S2  ABDOMEN: BS+, soft, ND, NT   EXTREMITIES:  pulses palpable; no clubbing, cyanosis, or edema b/l LEs  NEURO: awake, alert, interactive; moves all extremities

## 2025-05-04 NOTE — H&P ADULT - HISTORY OF PRESENT ILLNESS
45-year-old male PMH hypertension, Hep B presenting with facial numbness. Patient states half an hour prior to arrival patient developed left lower facial numbness. Denies numbness of the left forehead. Denies chest pain, difficulty breathing, nausea, vomiting, abdominal pain, numbness or weakness in the upper or lower extremities, difficulty walking, changes in vision, difficulty speaking. CT head and CT perfusion all normal on arrival, needs brain MRI.  45-year-old male PMH hypertension, Hep B presenting with facial numbness. Patient states half an hour prior to arrival patient developed left lower facial numbness. Denies numbness of the left forehead. Denies chest pain, difficulty breathing, nausea, vomiting, abdominal pain, numbness or weakness in the upper or lower extremities, difficulty walking, changes in vision, difficulty speaking.     CT head and CT perfusion all normal on arrival, needs brain MRI per radiology.

## 2025-05-04 NOTE — ED ADULT TRIAGE NOTE - CHIEF COMPLAINT QUOTE
L facial numbness since 4pm today . Hx Hepatitis B, HTN. Patient states that he had the flu on tuesday. Denies slurred speech, weakness. F/S 116 [place] code called in triage. MD tai completed with + code stroke determination.

## 2025-05-04 NOTE — CHART NOTE - NSCHARTNOTEFT_GEN_A_CORE
Medicine PA Event Note    Notified by RN that patient had fever 102.7 , Patient seen and examined at bedside. Patient gives history of being seen at Urgent Care on Thursday and tested positive for Flu B. Patient started taking Tamiflu on Thursday night. Patient stated that he had chills, bodyaches, fever , and cough.    HPI:  45-year-old male PMH hypertension, Hep B presenting with facial numbness. Patient states half an hour prior to arrival patient developed left lower facial numbness. Denies numbness of the left forehead. Denies chest pain, difficulty breathing, nausea, vomiting, abdominal pain, numbness or weakness in the upper or lower extremities, difficulty walking, changes in vision, difficulty speaking.   CT head and CT perfusion all normal on arrival, needs brain MRI per radiology.   (04 May 2025 18:04)      24 HOUR EVENTS:      ICU Vital Signs Last 24 Hrs  T(C): 39.3 (04 May 2025 22:40), Max: 39.3 (04 May 2025 22:40)  T(F): 102.7 (04 May 2025 22:40), Max: 102.7 (04 May 2025 22:40)  HR: 96 (04 May 2025 22:40) (83 - 100)  BP: 131/87 (04 May 2025 22:40) (123/89 - 148/98)  BP(mean): --  ABP: --  ABP(mean): --  RR: 19 (04 May 2025 22:40) (16 - 19)  SpO2: 99% (04 May 2025 22:40) (99% - 100%)    O2 Parameters below as of 04 May 2025 22:40  Patient On (Oxygen Delivery Method): room air          I&O's Summary      CAPILLARY BLOOD GLUCOSE      POCT Blood Glucose.: 116 mg/dL (04 May 2025 16:26)      ROS:   Negative for 12 body systems unless otherwise specified in HPI    Physical Exam  General: NAD , well groomed , well developed.   HEENT: atraumatic, PERRL, sclera non-icteric, moist mucous membranes, nares patent  Chest/Lungs: CTAB, no rales, no rhonchi, no wheezing  Heart: RRR  s1s2  Abd: Soft, nontender nondistended, BS present.   Extremities: Normal pulses,  No edema, normal capillary refill   Skin: warm, dry to touch  Neuro:A&O     MEDICATIONS:  MEDICATIONS  (STANDING):  aspirin enteric coated 81 milliGRAM(s) Oral daily  atorvastatin 40 milliGRAM(s) Oral at bedtime  oseltamivir 75 milliGRAM(s) Oral two times a day  sodium chloride 0.9%. 1000 milliLiter(s) (70 mL/Hr) IV Continuous <Continuous>  tenofovir disoproxil fumarate (VIREAD) 300 milliGRAM(s) Oral daily    MEDICATIONS  (PRN):  acetaminophen     Tablet .. 650 milliGRAM(s) Oral every 6 hours PRN Temp greater or equal to 38C (100.4F)       LABS/Micro/Rad/Cardio                        14.6   6.73  )-----------( 200      ( 04 May 2025 16:35 )             43.4     05-04    137  |  104  |  11  ----------------------------<  102[H]  3.5   |  29  |  0.92    Ca    8.7      04 May 2025 16:35    TPro  7.9  /  Alb  3.7  /  TBili  0.6  /  DBili  x   /  AST  26  /  ALT  46  /  AlkPhos  73  05-04          PT/INR - ( 04 May 2025 16:35 )   PT: 13.2 sec;   INR: 1.17 ratio         PTT - ( 04 May 2025 16:35 )  PTT:31.6 sec      Urinalysis Basic - ( 04 May 2025 16:35 )    Color: x / Appearance: x / SG: x / pH: x  Gluc: 102 mg/dL / Ketone: x  / Bili: x / Urobili: x   Blood: x / Protein: x / Nitrite: x   Leuk Esterase: x / RBC: x / WBC x   Sq Epi: x / Non Sq Epi: x / Bacteria: x          Telemetry: Reviewed   EKG: Reviewed  CXR: Reviewed   Culture:   Echo:       Imaging Personally Reviewed:  [ ] YES  [ ] NO  Consultant(s) Notes Reviewed:  [ ] YES  [ ] NO    Assessment/Plan:   - Fever  swab for Flu a/b covid  CXR r/o pna  blood cultures  NS 70cc hr   acetaminophen 650mg for fever  continue Tamiflu   DIscussed Case with Dr Cr  -  -   Instruct RN to notify ACP/MD if condition worsens or changes  Endorse to day shift to follow up with blood cultures.    Case Discussed with  _____     40 mins assessing presenting problems of acute illness. Medical decision making including Initiating plan of care, reviewing data, reviewing radiology, discussing with multidisciplinary team, non inclusive of procedures, discussing goals of care with patient/family Medicine PA Event Note    Notified by RN that patient had fever 102.7 , Patient seen and examined at bedside. Patient gives history of being seen at Urgent Care on Thursday and tested positive for Flu B. Patient started taking Tamiflu on Thursday night. Patient stated that he had chills, bodyaches, fever , and cough.    HPI:  45-year-old male PMH hypertension, Hep B presenting with facial numbness. Patient states half an hour prior to arrival patient developed left lower facial numbness. Denies numbness of the left forehead. Denies chest pain, difficulty breathing, nausea, vomiting, abdominal pain, numbness or weakness in the upper or lower extremities, difficulty walking, changes in vision, difficulty speaking.   CT head and CT perfusion all normal on arrival, needs brain MRI per radiology.   (04 May 2025 18:04)      24 HOUR EVENTS:      ICU Vital Signs Last 24 Hrs  T(C): 39.3 (04 May 2025 22:40), Max: 39.3 (04 May 2025 22:40)  T(F): 102.7 (04 May 2025 22:40), Max: 102.7 (04 May 2025 22:40)  HR: 96 (04 May 2025 22:40) (83 - 100)  BP: 131/87 (04 May 2025 22:40) (123/89 - 148/98)  BP(mean): --  ABP: --  ABP(mean): --  RR: 19 (04 May 2025 22:40) (16 - 19)  SpO2: 99% (04 May 2025 22:40) (99% - 100%)    O2 Parameters below as of 04 May 2025 22:40  Patient On (Oxygen Delivery Method): room air          I&O's Summary      CAPILLARY BLOOD GLUCOSE      POCT Blood Glucose.: 116 mg/dL (04 May 2025 16:26)      ROS:   Negative for 12 body systems unless otherwise specified in HPI    Physical Exam  General: NAD , well groomed , well developed.   HEENT: atraumatic, PERRL, sclera non-icteric, moist mucous membranes, nares patent  Chest/Lungs: CTAB, no rales, no rhonchi, no wheezing  Heart: RRR  s1s2  Abd: Soft, nontender nondistended, BS present.   Extremities: Normal pulses,  No edema, normal capillary refill   Skin: warm, dry to touch  Neuro:A&O x3 no slurred speech, no facial droop, tongue midline, no pronator drift,  decreased sensation to lower left side of face  Sensory 5/5 U/L extremities, movement of all extremities.     MEDICATIONS:  MEDICATIONS  (STANDING):  aspirin enteric coated 81 milliGRAM(s) Oral daily  atorvastatin 40 milliGRAM(s) Oral at bedtime  oseltamivir 75 milliGRAM(s) Oral two times a day  sodium chloride 0.9%. 1000 milliLiter(s) (70 mL/Hr) IV Continuous <Continuous>  tenofovir disoproxil fumarate (VIREAD) 300 milliGRAM(s) Oral daily    MEDICATIONS  (PRN):  acetaminophen     Tablet .. 650 milliGRAM(s) Oral every 6 hours PRN Temp greater or equal to 38C (100.4F)       LABS/Micro/Rad/Cardio                        14.6   6.73  )-----------( 200      ( 04 May 2025 16:35 )             43.4     05-04    137  |  104  |  11  ----------------------------<  102[H]  3.5   |  29  |  0.92    Ca    8.7      04 May 2025 16:35    TPro  7.9  /  Alb  3.7  /  TBili  0.6  /  DBili  x   /  AST  26  /  ALT  46  /  AlkPhos  73  05-04          PT/INR - ( 04 May 2025 16:35 )   PT: 13.2 sec;   INR: 1.17 ratio         PTT - ( 04 May 2025 16:35 )  PTT:31.6 sec      Urinalysis Basic - ( 04 May 2025 16:35 )    Color: x / Appearance: x / SG: x / pH: x  Gluc: 102 mg/dL / Ketone: x  / Bili: x / Urobili: x   Blood: x / Protein: x / Nitrite: x   Leuk Esterase: x / RBC: x / WBC x   Sq Epi: x / Non Sq Epi: x / Bacteria: x          Telemetry: Reviewed   EKG:  CXR:   Culture:   Echo:       Imaging Personally Reviewed:  [ ] YES  [ ] NO  Consultant(s) Notes Reviewed:  [ ] YES  [ ] NO    Assessment/Plan:   - Fever  swab for Flu a/b covid  CXR r/o pna  blood cultures  NS 70cc hr   acetaminophen 650mg for fever  continue Tamiflu   DIscussed Case with Dr Cr  -  -   Instruct RN to notify ACP/MD if condition worsens or changes  Endorse to day shift to follow up with blood cultures.    Case Discussed with Dr Cr     40 mins assessing presenting problems of acute illness. Medical decision making including Initiating plan of care, reviewing data, reviewing radiology, discussing with multidisciplinary team, non inclusive of procedures, discussing goals of care with patient/family Medicine PA Event Note    Notified by RN that patient had fever 102.7 , Patient seen and examined at bedside. Patient gives history of being seen at Urgent Care on Thursday and tested positive for Flu B. Patient started taking Tamiflu on Thursday night. Patient stated that he had chills, bodyaches, fever , and cough.    HPI:  45-year-old male PMH hypertension, Hep B presenting with facial numbness. Patient states half an hour prior to arrival patient developed left lower facial numbness. Denies numbness of the left forehead. Denies chest pain, difficulty breathing, nausea, vomiting, abdominal pain, numbness or weakness in the upper or lower extremities, difficulty walking, changes in vision, difficulty speaking.   CT head and CT perfusion all normal on arrival, needs brain MRI per radiology.   (04 May 2025 18:04)      24 HOUR EVENTS:      ICU Vital Signs Last 24 Hrs  T(C): 39.3 (04 May 2025 22:40), Max: 39.3 (04 May 2025 22:40)  T(F): 102.7 (04 May 2025 22:40), Max: 102.7 (04 May 2025 22:40)  HR: 96 (04 May 2025 22:40) (83 - 100)  BP: 131/87 (04 May 2025 22:40) (123/89 - 148/98)  BP(mean): --  ABP: --  ABP(mean): --  RR: 19 (04 May 2025 22:40) (16 - 19)  SpO2: 99% (04 May 2025 22:40) (99% - 100%)    O2 Parameters below as of 04 May 2025 22:40  Patient On (Oxygen Delivery Method): room air          I&O's Summary      CAPILLARY BLOOD GLUCOSE      POCT Blood Glucose.: 116 mg/dL (04 May 2025 16:26)      ROS:   Negative for 12 body systems unless otherwise specified in HPI    Physical Exam  General: NAD , well groomed , well developed.   HEENT: atraumatic, PERRL, sclera non-icteric, moist mucous membranes, nares patent  Chest/Lungs: CTAB, no rales, no rhonchi, no wheezing  Heart: RRR  s1s2  Abd: Soft, nontender nondistended, BS present.   Extremities: Normal pulses,  No edema, normal capillary refill   Skin: warm, dry to touch  Neuro:A&O x3 no slurred speech, no facial droop, tongue midline, no pronator drift,  decreased sensation to lower left side of face  Sensory 5/5 U/L extremities, movement of all extremities.     MEDICATIONS:  MEDICATIONS  (STANDING):  aspirin enteric coated 81 milliGRAM(s) Oral daily  atorvastatin 40 milliGRAM(s) Oral at bedtime  oseltamivir 75 milliGRAM(s) Oral two times a day  sodium chloride 0.9%. 1000 milliLiter(s) (70 mL/Hr) IV Continuous <Continuous>  tenofovir disoproxil fumarate (VIREAD) 300 milliGRAM(s) Oral daily    MEDICATIONS  (PRN):  acetaminophen     Tablet .. 650 milliGRAM(s) Oral every 6 hours PRN Temp greater or equal to 38C (100.4F)       LABS/Micro/Rad/Cardio                        14.6   6.73  )-----------( 200      ( 04 May 2025 16:35 )             43.4     05-04    137  |  104  |  11  ----------------------------<  102[H]  3.5   |  29  |  0.92    Ca    8.7      04 May 2025 16:35    TPro  7.9  /  Alb  3.7  /  TBili  0.6  /  DBili  x   /  AST  26  /  ALT  46  /  AlkPhos  73  05-04          PT/INR - ( 04 May 2025 16:35 )   PT: 13.2 sec;   INR: 1.17 ratio         PTT - ( 04 May 2025 16:35 )  PTT:31.6 sec      Urinalysis Basic - ( 04 May 2025 16:35 )    Color: x / Appearance: x / SG: x / pH: x  Gluc: 102 mg/dL / Ketone: x  / Bili: x / Urobili: x   Blood: x / Protein: x / Nitrite: x   Leuk Esterase: x / RBC: x / WBC x   Sq Epi: x / Non Sq Epi: x / Bacteria: x          Telemetry: Reviewed   EKG:  CXR: reviewed  Culture:   Echo:       Imaging Personally Reviewed:  [ ] YES  [ ] NO  Consultant(s) Notes Reviewed:  [ ] YES  [ ] NO    Assessment/Plan:   - Fever  swab for Flu a/b covid  CXR r/o pna  blood cultures  NS 70cc hr   acetaminophen 650mg for fever  continue Tamiflu   DIscussed Case with Dr Cr  -  -   Instruct RN to notify ACP/MD if condition worsens or changes  Endorse to day shift to follow up with blood cultures.    Case Discussed with Dr Cr     40 mins assessing presenting problems of acute illness. Medical decision making including Initiating plan of care, reviewing data, reviewing radiology, discussing with multidisciplinary team, non inclusive of procedures, discussing goals of care with patient/family

## 2025-05-04 NOTE — ED PROVIDER NOTE - CLINICAL SUMMARY MEDICAL DECISION MAKING FREE TEXT BOX
81-year-old female past medical history of fibroids presenting with vaginal bleeding. Patient states 2 days ago she developed vaginal spotting. Patient states she is not soaking through pads. Patient states she had similar symptoms in 2023 and diagnosed with fibroids. Patient had outpatient MRI and biopsy showing no cancerous lesions. Patient states she has not had vaginal bleeding since. Denies fevers, chest pain, difficulty breathing, nausea, vomiting, abdominal pain. Physical exam reveals well-appearing female, heart rate regular, clear breath sounds bilaterally, soft nontender abdomen, pelvic exam reveals small amount of brown blood in vaginal vault. Possible uterine fibroids. CBC, CMP, coags, type and screen, transvaginal ultrasound. 45-year-old male with a past medical history of hypertension presenting with facial numbness. Patient states half an hour prior to arrival patient developed left lower facial numbness. Denies numbness of the left forehead. Denies chest pain, difficulty breathing, nausea, vomiting, abdominal pain, numbness or weakness in the upper or lower extremities, difficulty walking, changes in vision, difficulty speaking. Physical exam reveals well-appearing male, heart rate regular, clear breath sounds bilaterally, soft nontender abdomen, 5/5 strength in bilateral upper and lower extremities, extraocular movements intact, visual field intact, no limb ataxia, no dysarthria, no facial asymmetry, mild sensory deficit of left lower face, left upper face sensation intact, NIHSS 1. Possible CVA. Code stroke. 45-year-old male with a past medical history of hypertension, Hep B presenting with facial numbness. Patient states half an hour prior to arrival patient developed left lower facial numbness. Denies numbness of the left forehead. Denies chest pain, difficulty breathing, nausea, vomiting, abdominal pain, numbness or weakness in the upper or lower extremities, difficulty walking, changes in vision, difficulty speaking. Physical exam reveals well-appearing male, heart rate regular, clear breath sounds bilaterally, soft nontender abdomen, 5/5 strength in bilateral upper and lower extremities, extraocular movements intact, visual field intact, no limb ataxia, no dysarthria, no facial asymmetry, mild sensory deficit of left lower face, left upper face sensation intact, NIHSS 1. Possible CVA. Code stroke.

## 2025-05-04 NOTE — H&P ADULT - NSHPLABSRESULTS_GEN_ALL_CORE
LABS:                        14.6   6.73  )-----------( 200      ( 04 May 2025 16:35 )             43.4     05-04    137  |  104  |  11  ----------------------------<  102[H]  3.5   |  29  |  0.92    Ca    8.7      04 May 2025 16:35    TPro  7.9  /  Alb  3.7  /  TBili  0.6  /  DBili  x   /  AST  26  /  ALT  46  /  AlkPhos  73  05-04    PT/INR - ( 04 May 2025 16:35 )   PT: 13.2 sec;   INR: 1.17 ratio         PTT - ( 04 May 2025 16:35 )  PTT:31.6 sec  Urinalysis Basic - ( 04 May 2025 16:35 )    Color: x / Appearance: x / SG: x / pH: x  Gluc: 102 mg/dL / Ketone: x  / Bili: x / Urobili: x   Blood: x / Protein: x / Nitrite: x   Leuk Esterase: x / RBC: x / WBC x   Sq Epi: x / Non Sq Epi: x / Bacteria: x          RADIOLOGY & ADDITIONAL TESTS:

## 2025-05-04 NOTE — H&P ADULT - ASSESSMENT
45-year-old male PMH hypertension, Hep B presenting with facial numbness. Patient states half an hour prior to arrival patient developed left lower facial numbness. Denies numbness of the left forehead. Denies chest pain, difficulty breathing, nausea, vomiting, abdominal pain, numbness or weakness in the upper or lower extremities, difficulty walking, changes in vision, difficulty speaking.       Plan:  Admit to tele for possible TIA vs CVA. CT head and CT perfusion all normal on arrival, needs brain MRI per radiology.  EKG is NSR, NIHSS is     1 due to left facial numbness. Glucose acceptable 102, CBC and SMA-7 all WNL. No prior history of cardiac stents or prior TIA.     Started ASA 81 mg/day, Lipitor 40 mg/day, lipid panel in AM, A1C in AM.  Takes no meds at home.     Brain MRI non-contrast ordered with anesthesia sedation, has claustrophobia.   45-year-old male PMH hypertension, Hep B presenting with facial numbness. Patient states half an hour prior to arrival patient developed left lower facial numbness. Denies numbness of the left forehead. Denies chest pain, difficulty breathing, nausea, vomiting, abdominal pain, numbness or weakness in the upper or lower extremities, difficulty walking, changes in vision, difficulty speaking.       Plan:  Admit to tele for possible TIA vs CVA. CT head and CT perfusion all normal on arrival, needs brain MRI per radiology.  EKG is NSR, NIHSS is     1 due to left facial numbness. Glucose acceptable 102, CBC and SMA-7 all WNL. No prior history of cardiac stents or prior TIA.     BP acceptable on arrival 150/98, no BP meds needed now.     Started ASA 81 mg/day, Lipitor 40 mg/day, lipid panel in AM, A1C in AM.  Continue home Tenofivir 300 mg/day for Hep B.     Brain MRI non-contrast ordered with anesthesia sedation, has claustrophobia.

## 2025-05-04 NOTE — ED PROVIDER NOTE - PHYSICAL EXAMINATION
General: Appears well and nontoxic  Mentation: A&O x 3  psych: mood appropriate  HEENT: airway patent, conjunctivae clear bilaterally  Resp: symmetric chest rise, no resp distress, breath sounds CTA bilaterally  Cardio: RRR, no m/r/g  GI: soft/nondistended/nontender  : pelvic exam reveals small amount of brown blood in vaginal vault (Chaperoned by Estela Oliver RN)  Neuro: sensation and motor function grossly intact  Skin: no cyanosis, no jaundice  MSK: normal movement of all extremities  Lymph/Vasc: no RAHUL edema General: Appears well and nontoxic  Mentation: A&O x 3  psych: mood appropriate  HEENT: airway patent, conjunctivae clear bilaterally  Resp: symmetric chest rise, no resp distress, breath sounds CTA bilaterally  Cardio: RRR, no m/r/g  GI: soft/nondistended/nontender  Neuro: sensation and motor function grossly intact, 5/5 strength in bilateral upper and lower extremities, extraocular movements intact, visual field intact, no limb ataxia, no dysarthria, no facial asymmetry, mild sensory deficit of left lower face, left upper face sensation intact, NIHSS 1  Skin: no cyanosis, no jaundice  MSK: normal movement of all extremities  Lymph/Vasc: no RAHUL edema

## 2025-05-04 NOTE — ED ADULT TRIAGE NOTE - BEFAST EYES
Cheilitis Normal Treatment: I recommended application of Vaseline or Aquaphor numerous times a day (as often as every hour) and before going to bed. No

## 2025-05-04 NOTE — CHART NOTE - NSCHARTNOTEFT_GEN_A_CORE
Discussion with: Case was discussed with Dr. Pepper on 5/4/2025 at 5:09 pm     HISTORY OF PRESENT ILLNESS:  Patient is a 44 y/o m with a PMHx of HTN, who p/w an acute onset of numbness on the left lower side of the face, LKW at 4:30 pm today. On exam in the ED, patient has a current NIHSS of 1 for a mild decrease in sensation on the left side of the face.       PMH/PSH is PAST MEDICAL & SURGICAL HISTORY:  Hepatitis B      Kidney stones      Hepatitis B      Presented to (OSH) on (date/time). Last known normal time was ().     Pre-stroke MRS=    *Modified Tok Score   0 - No symptoms.      CT HEAD IMAGING RESULTS:  NONCONTRAST HEAD CT:    Intracranial Hemorrhage: None.    Infarct/Vascular: No CT evidence of acute infarct.    Intracranial Mass: No evidence of intracranial mass.    CSF Spaces: The ventricles, sulci, and cisterns are normal.    Calvarium and Scalp: Unremarkable.    Paranasal Sinuses, Mastoid Air Cells, and Orbits: Mucosal thickening of   the maxillary sinuses, sphenoid sinuses, and some ethmoid air cells.   Visualized mastoid air cells clear. Orbits are unremarkable.  ______    CTA HEAD:    Right Anterior Circulation: The right petrous internal carotid artery is   patent without stenosis. The right cavernous internal carotid artery is   patent without stenosis. The right supraclinoid internal carotid artery   is patent without stenosis. The A1 and A2 segments of the right anterior   cerebral artery are patent without stenosis. The M1 and M2 segments of   the right middle cerebral artery are patent without stenosis.    Left Anterior Circulation: The left petrous internal carotid artery is   patent without stenosis. The left cavernous internal carotid artery is   patent without stenosis. The left supraclinoid internal carotid artery is   patent without stenosis. The A1 and A2 segments of the left anterior   cerebral artery are patent without stenosis. The M1 and M2 segments of   the left middle cerebral artery are patent without stenosis.    Posterior Circulation: Posteriorly, the distal vertebral arteries are   patent without stenosis. The basilar artery is patent without stenosis.   The P1 and P2 segments of the posterior cerebral arteries are patent   without stenosis.    Aneurysm/Vascular Malformation: No evidence of aneursym or vascular   malformation.    Dural Venous Sinuses: The visualized dural venous sinuses are patent   without evidence of dural venous sinus thrombosis.    Additional Findings: None.  ______    CTA NECK:    Right Carotid: The right common carotid artery is patent without   stenosis. The right internal carotid artery is patent without stenosis.    Left Carotid: The left common carotid artery is patent without stenosis.   The left internal carotid artery is patent without stenosis.    Right Vertebral: The right cervical vertebral artery is patent without   stenosis.    Left Vertebral: The left cervical vertebral artery is patent without   stenosis.    Arch/Great Vessels: The brachiocephalic and subclavian arteries are   patent without high grade stenosis.    Additional Findings: Prominent soft tissue palate.  ______    CT BRAIN PERFUSION:    Parametric Maps: There is no convincing regional perfusion abnormality.    CBF <30%: 0 mL  TMax > 6s: 12 mL in the inferior medial frontal lobes.  Mismatch volume: 12 mL  Mismatch ratio: Infinite.    Additional Findings: There is 97 mL of tissue scattered throughout the   brain calculated to have a Tmax greater than 4 seconds-indeterminate.    __________________  IMPRESSION:    NONCONTRAST HEAD CT:  1.  No acute intracranial abnormality.  2.  Sinus disease.    Critical findings discussed with Dr. Pepper by Dr. Anthony Yeh at   5/4/2025 4:47 PM.    CTA HEAD AND NECK:  1.   CTA HEAD: No large vessel occlusion, aneurysm, or hemodynamically   significant stenosis.  2.   CTA NECK: No occlusion, dissection, aneurysm, or hemodynamically   significant stenosis.    CT BRAIN PERFUSION:  1.  No core infarct.  2.  There is 12 mL of tissue calculated to have critical hypoperfusion in   the inferior medial frontal lobes, probably artifactual in the context of   the same day head CT without contrast/CTA of the head and neck. Consider   further evaluation with MR as clinically indicated.      Labs:  CAPILLARY BLOOD GLUCOSE      POCT Blood Glucose.: 116 mg/dL (04 May 2025 16:26)    Platelet Count - Automated: 200 K/uL (05-04-25 @ 16:35)    PT/INR - ( 04 May 2025 16:35 )   PT: 13.2 sec;   INR: 1.17 ratio         PTT - ( 04 May 2025 16:35 )  PTT:31.6 sec  NEUROLOGIC EXAMINATION deferred – interprofessional audio discussion only     Inclusion Criteria:  Clearly defined time onset within 4.5 hours of treatment Yes [x] No []    Ischemic stroke with a measurable deficit on NIHSS or a non-measurable deficit that is deemed disabling?  Yes [] no [x]  or  Unclear time of onset wake-up with stroke symptoms yes [] no[]  If yes:  [] Treatment can be initiated within 4.5 hrs. from symptom recognition  [] MRI can be obtained acutely from the emergency department  [] Not an endovascular stroke therapy candidate  [] Baseline functional independence.  Pre-stroke mRS of 0-1  [] NIHSS less than 26  [] DW-MRI with diffusion-positive. FLAIR – negative lesions indicating timing of stroke onset less than 4.5 hrs.  [] MRI mismatch between abnormal signal on DW-MRI and no visible signal change on FLAIR        Review thrombolytic CONTRAINDICATIONS  [] None    ABSOLUTE EXCLUSION CRITERIA:  [] Patient outside of the appropriate time window for IV thrombolysis  [] Evidence of intracranial hemorrhage on pretreatment CT scan (CT must be read by an attending radiologist or attending neurologist)  [] Head CT findings suggesting an established acute cerebral infarction as evidenced by etelvina hypodensity, regardless of size.  [] Clinical presentation consistent with subarachnoid hemorrhage, even with normal CT scan  [] Active internal bleeding  [] Known bleeding diathesis (including but not limited to platelet count < 100,000, use of oral anticoagulants with INR>1.7, use of full dose low molecular       weight heparin within the last 24 hours, use of unfractionated heparin AND a prolonged PTT (> 40sec), use of direct thrombin inhibitor (e.g. dabigatran) or oral direct Factor Xa inhibitor (e.g. rivaroxaban, apixaban) within 48 hours) with any degree of abnormality on any coagulation test; any DOAC taken within 3 hours of presentation, regardless of coagulation test results  [] Systolic pressure >= 185 mmHg or diastolic pressure 110 mmHg on repeated measurements at the time treatment is to begin  [] Care team unable to determine eligibility for IV thrombolysis  [] Patient, family, or surrogate declines and understands risks and benefits of treatment.  [] For wake-up Protocol patients: DW-MRI lesions larger than one-third of the MCA territory    RELATIVE EXCLUSION CRITERIA  [] Isolated neurological deficits (except for aphasia or hemianopsia)  [x] stroke severity too mild (non-disabling)  [] Seizure at onset with post-ictal residual neurological impairment  [] Gastrointestinal, genitourinary or respiratory hemorrhage within 21 days   [] Major surgery within 14 days   [] Blood glucose level < 50 or > 400 mG/dL  [] CPR with chest compressions or minor surgery (including liver and kidney biopsy, thoracocentesis, lumbar puncture) within 10 days   [] Arterial puncture at non-compressible site within 7 days   [] Evidence of acute trauma (fracture)   Significant head trauma or prior stroke in the previous 3 months  History of previous intracranial hemorrhage  Intracranial or intraspinal surgery within past 3 months[] Diabetic hemorrhagic retinopathy or ophthalmic bleeding  [] Pregnancy or peripartum; no nursing post- treatment  [] Post-myocardial infarction pericarditis  [] Peritoneal dialysis or hemodialysis or severe hepatic disease   [] Known bacterial endocarditis   [] Life expectancy less than 6 months or sever co-morbid illness   [] Known cerebral vascular malformation, untreated intracranial aneurysm or brain tumor (may consider IV alteplase in patients with CNS lesions that have a very low likelihood of hemorrhage, such as small un-ruptured aneurysms or benign tumors with low vascularity.  [] Social/Faith reason  [] Other ____________________    ASSESSMENT: Patient is a 44 y/o m with a PMHx of HTN, who p/w an acute onset of numbness on the left lower side of the face, LKW at 4:30 pm today. On exam in the ED, patient has a current NIHSS of 1 for a mild decrease in sensation on the left side of the face. CTH read no acute infarct. CTA read no LVO. CTP read 12 mL of hypoperfusion in the bv/l inferior medial frontal lobes, probably artifactual. Patient is not a current TNK candidate as his current left facial numbness is non-disabling. He is not a current neuro IR thrombectomy candidate as there is no LVO on vessel imaging.     RECOMMENDATIONS:  [] consider MRI brain

## 2025-05-05 ENCOUNTER — TRANSCRIPTION ENCOUNTER (OUTPATIENT)
Age: 45
End: 2025-05-05

## 2025-05-05 LAB
A1C WITH ESTIMATED AVERAGE GLUCOSE RESULT: 5.2 % — SIGNIFICANT CHANGE UP (ref 4–5.6)
CHOLEST SERPL-MCNC: 131 MG/DL — SIGNIFICANT CHANGE UP
ESTIMATED AVERAGE GLUCOSE: 103 MG/DL — SIGNIFICANT CHANGE UP (ref 68–114)
FLUAV AG NPH QL: SIGNIFICANT CHANGE UP
FLUBV AG NPH QL: SIGNIFICANT CHANGE UP
HDLC SERPL-MCNC: 28 MG/DL — LOW
LDLC SERPL-MCNC: 81 MG/DL — SIGNIFICANT CHANGE UP
LIPID PNL WITH DIRECT LDL SERPL: 81 MG/DL — SIGNIFICANT CHANGE UP
NONHDLC SERPL-MCNC: 103 MG/DL — SIGNIFICANT CHANGE UP
RSV RNA NPH QL NAA+NON-PROBE: SIGNIFICANT CHANGE UP
SARS-COV-2 RNA SPEC QL NAA+PROBE: SIGNIFICANT CHANGE UP
SOURCE RESPIRATORY: SIGNIFICANT CHANGE UP
TRIGL SERPL-MCNC: 120 MG/DL — SIGNIFICANT CHANGE UP

## 2025-05-05 PROCEDURE — 70551 MRI BRAIN STEM W/O DYE: CPT | Mod: 26

## 2025-05-05 RX ORDER — DIAZEPAM 2 MG/1
5 TABLET ORAL ONCE
Refills: 0 | Status: DISCONTINUED | OUTPATIENT
Start: 2025-05-05 | End: 2025-05-05

## 2025-05-05 RX ADMIN — TENOFOVIR DISOPROXIL FUMARATE 300 MILLIGRAM(S): 300 TABLET, FILM COATED ORAL at 11:27

## 2025-05-05 RX ADMIN — OSELTAMIVIR PHOSPHATE 75 MILLIGRAM(S): 75 CAPSULE ORAL at 05:51

## 2025-05-05 RX ADMIN — Medication 70 MILLILITER(S): at 02:47

## 2025-05-05 RX ADMIN — ATORVASTATIN CALCIUM 40 MILLIGRAM(S): 80 TABLET, FILM COATED ORAL at 22:07

## 2025-05-05 RX ADMIN — OSELTAMIVIR PHOSPHATE 75 MILLIGRAM(S): 75 CAPSULE ORAL at 17:39

## 2025-05-05 RX ADMIN — DIAZEPAM 5 MILLIGRAM(S): 2 TABLET ORAL at 11:05

## 2025-05-05 RX ADMIN — Medication 81 MILLIGRAM(S): at 11:27

## 2025-05-05 RX ADMIN — DIAZEPAM 5 MILLIGRAM(S): 2 TABLET ORAL at 14:13

## 2025-05-05 NOTE — DISCHARGE NOTE NURSING/CASE MANAGEMENT/SOCIAL WORK - NSDCPEFALRISK_GEN_ALL_CORE
For information on Fall & Injury Prevention, visit: [email or web address] OR  [email or web address] OR  https://www.cdc.gov/steadi/patient.html

## 2025-05-05 NOTE — CONSULT NOTE ADULT - ASSESSMENT
45-year-old male PMH hypertension, Hep B presenting with left facial numbness. Exam otherwise non-focal. NIHSS 1 for left facial numbness  CTH CTA no acute findings  claustrophobic to obtain MRI    Impression: left facial numbness    MRI brain, can be done here with sedation or open MRi as an outpt  Aspirin for secondary stroke prevention at this time.   Can consider Plavix 75 mg x 3 weeks  Atorvastatin , titrate the dose according to LDL.   TTE and telemetry   DVT prophylaxis, Neurochecks  Permissive HTN up to 220/120 mmHg for first 24 hours after the symptom onset followed by gradual normotension.   HbA1C and LDL.   PT/OT/Speech and swallow/safety eval.  Can follow up with Neurology, Dr. Kory Christopher at [phone]

## 2025-05-05 NOTE — CONSULT NOTE ADULT - SUBJECTIVE AND OBJECTIVE BOX
Neurology consult    HEATHER De La Cruz     Patient is a 45y old  Male who presents with a chief complaint of Left facial numbness (04 May 2025 18:04)      HPI:  45-year-old male PMH hypertension, Hep B presenting with facial numbness. Patient states half an hour prior to arrival patient developed left lower facial numbness. Denies numbness of the left forehead. Denies chest pain, difficulty breathing, nausea, vomiting, abdominal pain, numbness or weakness in the upper or lower extremities, difficulty walking, changes in vision, difficulty speaking.     CT head and CT perfusion all normal on arrival, needs brain MRI per radiology.   (04 May 2025 18:04)      no difficulty with language.  No vision loss or double vision.  No dizziness, vertigo or new hearing loss.  . No difficulty with swallowing.  No focal weakness.  No focal sensory changes.  No numbness or tingling in the bilateral lower extremities.  No difficulty with balance.  No difficulty with ambulation.    REVIEW OF SYSTEMS:    Constitutional: No fever, chills, fatigue, weakness  Eyes: no eye pain, visual disturbances, or discharge  ENT:  No difficulty hearing, tinnitus, vertigo; No sinus or throat pain  Neck: No pain or stiffness  Respiratory: No cough, dyspnea, wheezing   Cardiovascular: No chest pain, palpitations,   Gastrointestinal: No abdominal or epigastric pain. No nausea, vomiting  No diarrhea or constipation.   Genitourinary: No dysuria, frequency, hematuria or incontinence  Neurological: No headaches, lightheadedness, vertigo, numbness or tremors  Psychiatric: No depression, anxiety, mood swings or difficulty sleeping  Musculoskeletal: No joint pain or swelling; No muscle, back or extremity pain  Skin: No itching, burning, rashes or lesions   Lymph Nodes: No enlarged glands  Endocrine: No heat or cold intolerance; No hair loss, No h/o diabetes or thyroid dysfunction  Allergy and Immunologic: No hives or eczema    MEDICATIONS    acetaminophen     Tablet .. 650 milliGRAM(s) Oral every 6 hours PRN  aspirin enteric coated 81 milliGRAM(s) Oral daily  atorvastatin 40 milliGRAM(s) Oral at bedtime  oseltamivir 75 milliGRAM(s) Oral two times a day  sodium chloride 0.9%. 1000 milliLiter(s) IV Continuous <Continuous>  tenofovir disoproxil fumarate (VIREAD) 300 milliGRAM(s) Oral daily      PMH: No significant past medical history    Hepatitis B    Kidney stones         PSH: No significant past surgical history    Hepatitis B        Family history: No history of dementia, strokes, or seizures   FAMILY HISTORY:      SOCIAL HISTORY:  No history of tobacco or alcohol use     Allergies    No Known Allergies    Intolerances        Height (cm): 177.8 (05-04 @ 22:40)  Weight (kg): 79 (05-04 @ 22:40)  BMI (kg/m2): 25 (05-04 @ 22:40)    Vital Signs Last 24 Hrs  T(C): 37 (05 May 2025 05:08), Max: 39.3 (04 May 2025 22:40)  T(F): 98.6 (05 May 2025 05:08), Max: 102.7 (04 May 2025 22:40)  HR: 82 (05 May 2025 05:08) (82 - 100)  BP: 114/75 (05 May 2025 05:08) (114/75 - 148/98)  BP(mean): --  RR: 18 (05 May 2025 05:08) (16 - 19)  SpO2: 99% (05 May 2025 05:08) (95% - 100%)    Parameters below as of 05 May 2025 05:08  Patient On (Oxygen Delivery Method): room air          On Neurological Examination:    Neuro: AAOx3; knows age, month, obeys commands, no dysarthria; calculations intact, fluent names, repeats     CN: PERRL, EOMI, VFF normal gaze preference, no facial palsy,     Motor: no drift in all extremeties    Sensory: Intact to LT and PP no extinction    Coordination: FTN intact b/l            GENERAL Exam:     Nontoxic , No Acute Distress   	  HEENT:  normocephalic, atraumatic  		  LUNGS:	Clear bilaterally  No Wheeze    	  HEART:	 Normal S1S2   No murmur RRR        	  GI/ ABDOMEN:  Soft  Non tender    EXTREMITIES:   No Edema  No Clubbing  No Cyanosis No Edema    MUSCULOSKELETAL: Normal Range of Motion  	   SKIN:      Normal   No Ecchymosis               LABS:  CBC Full  -  ( 04 May 2025 16:35 )  WBC Count : 6.73 K/uL  RBC Count : 5.35 M/uL  Hemoglobin : 14.6 g/dL  Hematocrit : 43.4 %  Platelet Count - Automated : 200 K/uL  Mean Cell Volume : 81.1 fl  Mean Cell Hemoglobin : 27.3 pg  Mean Cell Hemoglobin Concentration : 33.6 g/dL  Auto Neutrophil # : x  Auto Lymphocyte # : x  Auto Monocyte # : x  Auto Eosinophil # : x  Auto Basophil # : x  Auto Neutrophil % : x  Auto Lymphocyte % : x  Auto Monocyte % : x  Auto Eosinophil % : x  Auto Basophil % : x    Urinalysis Basic - ( 04 May 2025 16:35 )    Color: x / Appearance: x / SG: x / pH: x  Gluc: 102 mg/dL / Ketone: x  / Bili: x / Urobili: x   Blood: x / Protein: x / Nitrite: x   Leuk Esterase: x / RBC: x / WBC x   Sq Epi: x / Non Sq Epi: x / Bacteria: x      05-04    137  |  104  |  11  ----------------------------<  102[H]  3.5   |  29  |  0.92    Ca    8.7      04 May 2025 16:35    TPro  7.9  /  Alb  3.7  /  TBili  0.6  /  DBili  x   /  AST  26  /  ALT  46  /  AlkPhos  73  05-04    LIVER FUNCTIONS - ( 04 May 2025 16:35 )  Alb: 3.7 g/dL / Pro: 7.9 gm/dL / ALK PHOS: 73 U/L / ALT: 46 U/L / AST: 26 U/L / GGT: x           Hemoglobin A1C:   Lipid Panel 05-05 @ 06:35  Total Cholesterol, Serum 131  LDL --  Triglycerides 120      PT/INR - ( 04 May 2025 16:35 )   PT: 13.2 sec;   INR: 1.17 ratio         PTT - ( 04 May 2025 16:35 )  PTT:31.6 sec      RADIOLOGY  < from: CT Brain Perfusion Maps Stroke (05.04.25 @ 16:46) >  NONCONTRAST HEAD CT:  1.  No acute intracranial abnormality.  2.  Sinus disease.    Critical findings discussed with Dr. Pepper by Dr. Anthony Yeh at   5/4/2025 4:47 PM.    CTA HEAD AND NECK:  1.   CTA HEAD: No large vesselocclusion, aneurysm, or hemodynamically   significant stenosis.  2.   CTA NECK: No occlusion, dissection, aneurysm, or hemodynamically   significant stenosis.    CT BRAIN PERFUSION:  1.  No core infarct.  2.  There is 12 mL of tissue calculated to have critical hypoperfusion in   the inferior medial frontal lobes, probably artifactual in the context of   the same day head CT without contrast/CTA of the head and neck. Consider   further evaluation with MR as clinically indicated.    < end of copied text >

## 2025-05-05 NOTE — PATIENT PROFILE ADULT - FALL HARM RISK - UNIVERSAL INTERVENTIONS
Bed in lowest position, wheels locked, appropriate side rails in place/Call bell, personal items and telephone in reach/Instruct patient to call for assistance before getting out of bed or chair/Non-slip footwear when patient is out of bed/[place] to call system/Physically safe environment - no spills, clutter or unnecessary equipment/Purposeful Proactive Rounding/Room/bathroom lighting operational, light cord in reach

## 2025-05-05 NOTE — DISCHARGE NOTE NURSING/CASE MANAGEMENT/SOCIAL WORK - FINANCIAL ASSISTANCE
[facility] provides services at a reduced cost to those who are determined to be eligible through [facility]’s financial assistance program. Information regarding [facility]’s financial assistance program can be found by going to [email or web address] or by calling [phone].

## 2025-05-05 NOTE — PATIENT PROFILE ADULT - HEALTH LITERACY
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  Patient's mother denies any fevers and/or infections.  PIV placed on second attempt by Malu VELAZQUEZ. Premedication of Tylenol given prior to the start of the infusion. First dose 100mg Fabrazyme was infused at 50 ml/hr as ordered. However, BP continued to drop with hourly VS checks, pt not symptomatic. Colleen paged about BP at 1230 of 111/49. RN did hear back so rechecked BP a half hour later and BP was 109/44. RN paused infusion at this time for 10 minutes and restarted infusion at 40 mL/hr per mom's request. Next BP check 117/62. RN spoke with Colleen and asker her to place clear instructions for when this happens for future infusions incase RN staff can not get hold of team. Infusion completed without complication.  PIV removed.  Patient left with mother in stable condition when visit was complete.    no

## 2025-05-05 NOTE — PROGRESS NOTE ADULT - ASSESSMENT
45-year-old male PMH hypertension, Hep B presenting with facial numbness. Patient states half an hour prior to arrival patient developed left lower facial numbness. Denies numbness of the left forehead. Denies chest pain, difficulty breathing, nausea, vomiting, abdominal pain, numbness or weakness in the upper or lower extremities, difficulty walking, changes in vision, difficulty speaking.       Plan:  Admit to tele for possible TIA vs CVA. CT head and CT perfusion all normal on arrival, needs brain MRI per radiology.  EKG is NSR, NIHSS is     1 due to left facial numbness. Glucose acceptable 102, CBC and SMA-7 all WNL. No prior history of cardiac stents or prior TIA.     BP acceptable on arrival 150/98, no BP meds needed now.     Started ASA 81 mg/day, Lipitor 40 mg/day, lipid panel in AM, A1C in AM.  Continue home Tenofivir 300 mg/day for Hep B.     Brain MRI non-contrast ordered with anesthesia sedation, has claustrophobia.   45-year-old male PMH hypertension, Hep B presenting with facial numbness. Patient states half an hour prior to arrival patient developed left lower facial numbness. Denies numbness of the left forehead. Denies chest pain, difficulty breathing, nausea, vomiting, abdominal pain, numbness or weakness in the upper or lower extremities, difficulty walking, changes in vision, difficulty speaking.       Plan:  Admit to tele for possible TIA vs CVA. CT head and CT perfusion all normal on arrival, needs brain MRI per radiology.  EKG is NSR, NIHSS is     1 due to left facial numbness. Glucose acceptable 102, CBC and SMA-7 all WNL. No prior history of cardiac stents or prior TIA.     BP acceptable on arrival 150/98, no BP meds needed now.     Started ASA 81 mg/day, Lipitor 40 mg/day, lipid panel in AM, A1C in AM.  Continue home Tenofivir 300 mg/day for Hep B.     Brain MRI non-contrast ordered with anesthesia sedation, has claustrophobia.      Neuro follow up consult appreciated.  45-year-old male PMH hypertension, Hep B presenting with facial numbness. Patient states half an hour prior to arrival patient developed left lower facial numbness. Denies numbness of the left forehead. Denies chest pain, difficulty breathing, nausea, vomiting, abdominal pain, numbness or weakness in the upper or lower extremities, difficulty walking, changes in vision, difficulty speaking.       Plan:  Admit to tele for possible TIA vs CVA. CT head and CT perfusion all normal on arrival, needs brain MRI per radiology.  EKG is NSR, NIHSS is     1 due to left facial numbness. Glucose acceptable 102, CBC and SMA-7 all WNL. No prior history of cardiac stents or prior TIA.     BP acceptable on arrival 150/98, no BP meds needed now.     Started ASA 81 mg/day, Lipitor 40 mg/day, lipid panel in AM, A1C in AM.  Continue home Tenofivir 300 mg/day for Hep B.     Brain MRI non-contrast ordered with PO Valium. Has claustrophobia, no conscious sedation is available now with MRI.       Neuro follow up consult appreciated.

## 2025-05-05 NOTE — DISCHARGE NOTE NURSING/CASE MANAGEMENT/SOCIAL WORK - PATIENT PORTAL LINK FT
You can access the FollowMyHealth Patient Portal offered by [facility] by registering at the following website: [email or web address]. By joining [facility]’s FollowMyHealth portal, you will also be able to view your health information using other applications (apps) compatible with our system.

## 2025-05-06 ENCOUNTER — TRANSCRIPTION ENCOUNTER (OUTPATIENT)
Age: 45
End: 2025-05-06

## 2025-05-06 VITALS
DIASTOLIC BLOOD PRESSURE: 71 MMHG | TEMPERATURE: 98 F | HEART RATE: 79 BPM | RESPIRATION RATE: 18 BRPM | SYSTOLIC BLOOD PRESSURE: 107 MMHG | OXYGEN SATURATION: 95 %

## 2025-05-06 PROCEDURE — 99238 HOSP IP/OBS DSCHRG MGMT 30/<: CPT

## 2025-05-06 PROCEDURE — 99239 HOSP IP/OBS DSCHRG MGMT >30: CPT

## 2025-05-06 RX ORDER — CEFUROXIME SODIUM 1.5 G
500 VIAL (EA) INJECTION EVERY 12 HOURS
Refills: 0 | Status: DISCONTINUED | OUTPATIENT
Start: 2025-05-06 | End: 2025-05-06

## 2025-05-06 RX ORDER — CEFUROXIME SODIUM 1.5 G
1 VIAL (EA) INJECTION
Qty: 14 | Refills: 0
Start: 2025-05-06 | End: 2025-05-12

## 2025-05-06 RX ADMIN — TENOFOVIR DISOPROXIL FUMARATE 300 MILLIGRAM(S): 300 TABLET, FILM COATED ORAL at 13:47

## 2025-05-06 RX ADMIN — Medication 81 MILLIGRAM(S): at 13:47

## 2025-05-06 RX ADMIN — OSELTAMIVIR PHOSPHATE 75 MILLIGRAM(S): 75 CAPSULE ORAL at 06:43

## 2025-05-06 NOTE — DISCHARGE NOTE PROVIDER - NSDCCPCAREPLAN_GEN_ALL_CORE_FT
PRINCIPAL DISCHARGE DIAGNOSIS  Diagnosis: Facial numbness  Assessment and Plan of Treatment: Please follow up with neurology Dr Christopher outpatient.   Finish your antibiotics for pneumonia.   Recommend to obtain outpatient MRI cervical spine.

## 2025-05-06 NOTE — DISCHARGE NOTE PROVIDER - CARE PROVIDER_API CALL
Kory Christopher)  Neurology  [address], [address]  [place] [address]  Phone: [phone]  Fax: [phone]  Follow Up Time: 7-10 Days

## 2025-05-06 NOTE — PROGRESS NOTE ADULT - ASSESSMENT
45-year-old male PMH hypertension, Hep B presenting with left facial numbness. Exam otherwise non-focal. NIHSS 1 for left facial numbness  CTH CTA no acute findings  MRI brain negative    Impression: left facial numbness, possible referred from known cervical disease    no need for Aspirin  outpt MR C spine  Can follow up with Neurology, Dr. Kory Christopher at [phone]

## 2025-05-06 NOTE — PROGRESS NOTE ADULT - ASSESSMENT
45-year-old male PMH hypertension, Hep B presenting with facial numbness. Patient states half an hour prior to arrival patient developed left lower facial numbness. Denies numbness of the left forehead. Denies chest pain, difficulty breathing, nausea, vomiting, abdominal pain, numbness or weakness in the upper or lower extremities, difficulty walking, changes in vision, difficulty speaking.       Plan:  Admit to tele for possible TIA vs CVA. CT head and CT perfusion all normal on arrival, needs brain MRI per radiology.  EKG is NSR, NIHSS is     1 due to left facial numbness. Glucose acceptable 102, CBC and SMA-7 all WNL. No prior history of cardiac stents or prior TIA.     BP acceptable on arrival 150/98, no BP meds needed now.     Started ASA 81 mg/day, Lipitor 40 mg/day, lipid panel in AM, A1C in AM.  Continue home Tenofivir 300 mg/day for Hep B.     Brain MRI non-contrast ordered with PO Valium. Has claustrophobia, no conscious sedation is available now with MRI.       Neuro follow up consult appreciated.     Await official MRI read.  45-year-old male PMH hypertension, Hep B presenting with facial numbness. Patient states half an hour prior to arrival patient developed left lower facial numbness. Denies numbness of the left forehead. Denies chest pain, difficulty breathing, nausea, vomiting, abdominal pain, numbness or weakness in the upper or lower extremities, difficulty walking, changes in vision, difficulty speaking.       Plan:  Admit to tele for possible TIA vs CVA. CT head and CT perfusion all normal on arrival, needs brain MRI per radiology.  EKG is NSR, NIHSS is     1 due to left facial numbness. Glucose acceptable 102, CBC and SMA-7 all WNL. No prior history of cardiac stents or prior TIA.     BP acceptable on arrival 150/98, no BP meds needed now.     Started ASA 81 mg/day, Lipitor 40 mg/day, lipid panel in AM, A1C in AM.  Continue home Tenofivir 300 mg/day for Hep B.     Brain MRI non-contrast ordered with PO Valium. Has claustrophobia, no conscious sedation is available now with MRI.       Neuro follow up consult appreciated.     Await official MRI read.     Note: Brain MRI normal, discharge home, add Ceftin for 7 days for CAP.  45-year-old male PMH hypertension, Hep B presenting with facial numbness. Patient states half an hour prior to arrival patient developed left lower facial numbness. Denies numbness of the left forehead. Denies chest pain, difficulty breathing, nausea, vomiting, abdominal pain, numbness or weakness in the upper or lower extremities, difficulty walking, changes in vision, difficulty speaking.       Plan:  Admit to tele for possible TIA vs CVA. CT head and CT perfusion all normal on arrival, needs brain MRI per radiology.  EKG is NSR, NIHSS is     1 due to left facial numbness. Glucose acceptable 102, CBC and SMA-7 all WNL. No prior history of cardiac stents or prior TIA.     BP acceptable on arrival 150/98, no BP meds needed now.     Started ASA 81 mg/day, Lipitor 40 mg/day, lipid panel in AM, A1C in AM.  Continue home Tenofivir 300 mg/day for Hep B.     Brain MRI non-contrast ordered with PO Valium. Has claustrophobia, no conscious sedation is available now with MRI.       Neuro follow up consult appreciated.     Note: Brain MRI normal, discharge home, add Ceftin for 7 days for CAP.     Left facial numbness may be from cervical stenosis, will see his Neuro Surgeon in 1 week.

## 2025-05-06 NOTE — DISCHARGE NOTE PROVIDER - HOSPITAL COURSE
This is a 45-year-old male with hypertension and hepatitis B presented with acute onset of isolated left lower facial numbness. He denied other neurological symptoms. Initial evaluation, including vital signs, glucose, CBC, CMP, and EKG, was normal. CT head and CT perfusion were unremarkable. He was admitted for concern of possible TIA/CVA and started on aspirin, atorvastatin, and continued on home tenofovir. A non-contrast brain MRI, performed with premedication for claustrophobia, was normal. Neurology attributed the facial numbness to likely referred pain from known cervical disease, recommended discontinuation of aspirin, outpatient cervical spine MRI, and follow-up with Dr. Christopher. The patient was also diagnosed with CAP and prescribed ceftin.     Discharge Diagnoses:  Left lower facial numbness (likely referred from cervical disease)  Community-acquired pneumonia (CAP)  Hepatitis B (chronic)  Hypertension

## 2025-05-06 NOTE — PROGRESS NOTE ADULT - SUBJECTIVE AND OBJECTIVE BOX
Patient seen and examined this am. No new events    MEDICATIONS:    acetaminophen     Tablet .. 650 milliGRAM(s) Oral every 6 hours PRN  aspirin enteric coated 81 milliGRAM(s) Oral daily  atorvastatin 40 milliGRAM(s) Oral at bedtime  oseltamivir 75 milliGRAM(s) Oral two times a day  tenofovir disoproxil fumarate (VIREAD) 300 milliGRAM(s) Oral daily      LABS:                          14.6   6.73  )-----------( 200      ( 04 May 2025 16:35 )             43.4     05-04    137  |  104  |  11  ----------------------------<  102[H]  3.5   |  29  |  0.92    Ca    8.7      04 May 2025 16:35    TPro  7.9  /  Alb  3.7  /  TBili  0.6  /  DBili  x   /  AST  26  /  ALT  46  /  AlkPhos  73  05-04    CAPILLARY BLOOD GLUCOSE        PT/INR - ( 04 May 2025 16:35 )   PT: 13.2 sec;   INR: 1.17 ratio         PTT - ( 04 May 2025 16:35 )  PTT:31.6 sec  Urinalysis Basic - ( 04 May 2025 16:35 )    Color: x / Appearance: x / SG: x / pH: x  Gluc: 102 mg/dL / Ketone: x  / Bili: x / Urobili: x   Blood: x / Protein: x / Nitrite: x   Leuk Esterase: x / RBC: x / WBC x   Sq Epi: x / Non Sq Epi: x / Bacteria: x      I&O's Summary    05 May 2025 07:01  -  06 May 2025 07:00  --------------------------------------------------------  IN: 318 mL / OUT: 0 mL / NET: 318 mL      Vital Signs Last 24 Hrs  T(C): 36.8 (06 May 2025 04:49), Max: 37.8 (05 May 2025 16:30)  T(F): 98.3 (06 May 2025 04:49), Max: 100 (05 May 2025 16:30)  HR: 83 (06 May 2025 04:49) (73 - 97)  BP: 104/69 (06 May 2025 04:49) (104/69 - 132/81)  BP(mean): --  RR: 20 (06 May 2025 04:49) (18 - 20)  SpO2: 97% (06 May 2025 04:49) (97% - 98%)    Parameters below as of 06 May 2025 04:49  Patient On (Oxygen Delivery Method): room air        MEDICATIONS    acetaminophen     Tablet .. 650 milliGRAM(s) Oral every 6 hours PRN  aspirin enteric coated 81 milliGRAM(s) Oral daily  atorvastatin 40 milliGRAM(s) Oral at bedtime  oseltamivir 75 milliGRAM(s) Oral two times a day  sodium chloride 0.9%. 1000 milliLiter(s) IV Continuous <Continuous>  tenofovir disoproxil fumarate (VIREAD) 300 milliGRAM(s) Oral daily      PMH: No significant past medical history    Hepatitis B    Kidney stones         PSH: No significant past surgical history    Hepatitis B        Family history: No history of dementia, strokes, or seizures   FAMILY HISTORY:      SOCIAL HISTORY:  No history of tobacco or alcohol use     Allergies    No Known Allergies    Intolerances        Height (cm): 177.8 (05-04 @ 22:40)  Weight (kg): 79 (05-04 @ 22:40)  BMI (kg/m2): 25 (05-04 @ 22:40)    Vital Signs Last 24 Hrs  T(C): 37 (05 May 2025 05:08), Max: 39.3 (04 May 2025 22:40)  T(F): 98.6 (05 May 2025 05:08), Max: 102.7 (04 May 2025 22:40)  HR: 82 (05 May 2025 05:08) (82 - 100)  BP: 114/75 (05 May 2025 05:08) (114/75 - 148/98)  BP(mean): --  RR: 18 (05 May 2025 05:08) (16 - 19)  SpO2: 99% (05 May 2025 05:08) (95% - 100%)    Parameters below as of 05 May 2025 05:08  Patient On (Oxygen Delivery Method): room air          On Neurological Examination:    Neuro: AAOx3; knows age, month, obeys commands, no dysarthria; calculations intact, fluent names, repeats     CN: PERRL, EOMI, VFF normal gaze preference, no facial palsy,     Motor: no drift in all extremeties    Sensory: Intact to LT and PP no extinction    Coordination: FTN intact b/l            GENERAL Exam:     Nontoxic , No Acute Distress   	  HEENT:  normocephalic, atraumatic  		  LUNGS:	Clear bilaterally  No Wheeze    	  HEART:	 Normal S1S2   No murmur RRR        	  GI/ ABDOMEN:  Soft  Non tender    EXTREMITIES:   No Edema  No Clubbing  No Cyanosis No Edema    MUSCULOSKELETAL: Normal Range of Motion  	   SKIN:      Normal   No Ecchymosis               RADIOLOGY  < from: CT Brain Perfusion Maps Stroke (05.04.25 @ 16:46) >  NONCONTRAST HEAD CT:  1.  No acute intracranial abnormality.  2.  Sinus disease.    Critical findings discussed with Dr. Pepper by Dr. Anthony Yeh at   5/4/2025 4:47 PM.    CTA HEAD AND NECK:  1.   CTA HEAD: No large vesselocclusion, aneurysm, or hemodynamically   significant stenosis.  2.   CTA NECK: No occlusion, dissection, aneurysm, or hemodynamically   significant stenosis.    CT BRAIN PERFUSION:  1.  No core infarct.  2.  There is 12 mL of tissue calculated to have critical hypoperfusion in   the inferior medial frontal lobes, probably artifactual in the context of   the same day head CT without contrast/CTA of the head and neck. Consider   further evaluation with MR as clinically indicated.    < end of copied text >    < from: MR Head No Cont (05.05.25 @ 15:15) >  Unremarkable MRI brain for age, no evidence for acute hemorrhage,   restricted diffusion, or midline shift.    < end of copied text >        
INTERVAL HPI/OVERNIGHT EVENTS:  Pt seen and examined at bedside.     Allergies/Intolerance: No Known Allergies      MEDICATIONS  (STANDING):  aspirin enteric coated 81 milliGRAM(s) Oral daily  atorvastatin 40 milliGRAM(s) Oral at bedtime  oseltamivir 75 milliGRAM(s) Oral two times a day  sodium chloride 0.9%. 1000 milliLiter(s) (70 mL/Hr) IV Continuous <Continuous>  tenofovir disoproxil fumarate (VIREAD) 300 milliGRAM(s) Oral daily    MEDICATIONS  (PRN):  acetaminophen     Tablet .. 650 milliGRAM(s) Oral every 6 hours PRN Temp greater or equal to 38C (100.4F)  diazepam    Tablet 5 milliGRAM(s) Oral once PRN prior to MRI        ROS: all systems reviewed and wnl      PHYSICAL EXAMINATION:  Vital Signs Last 24 Hrs  T(C): 37 (05 May 2025 05:08), Max: 39.3 (04 May 2025 22:40)  T(F): 98.6 (05 May 2025 05:08), Max: 102.7 (04 May 2025 22:40)  HR: 82 (05 May 2025 05:08) (82 - 100)  BP: 114/75 (05 May 2025 05:08) (114/75 - 148/98)  BP(mean): --  RR: 18 (05 May 2025 05:08) (16 - 19)  SpO2: 99% (05 May 2025 05:08) (95% - 100%)    Parameters below as of 05 May 2025 05:08  Patient On (Oxygen Delivery Method): room air      CAPILLARY BLOOD GLUCOSE      POCT Blood Glucose.: 116 mg/dL (04 May 2025 16:26)      05-05 @ 07:01  -  05-05 @ 10:39  --------------------------------------------------------  IN: 318 mL / OUT: 0 mL / NET: 318 mL        GENERAL: mild left facial numbness, speech fluent, full MP arms and legs 5/5 bilaterally  NECK: supple, No JVD  CHEST/LUNG: clear to auscultation bilaterally; no rales, rhonchi, or wheezing b/l  HEART: normal S1, S2  ABDOMEN: BS+, soft, ND, NT   EXTREMITIES:  pulses palpable; no clubbing, cyanosis, or edema b/l LEs    LABS:                        14.6   6.73  )-----------( 200      ( 04 May 2025 16:35 )             43.4     05-04    137  |  104  |  11  ----------------------------<  102[H]  3.5   |  29  |  0.92    Ca    8.7      04 May 2025 16:35    TPro  7.9  /  Alb  3.7  /  TBili  0.6  /  DBili  x   /  AST  26  /  ALT  46  /  AlkPhos  73  05-04    PT/INR - ( 04 May 2025 16:35 )   PT: 13.2 sec;   INR: 1.17 ratio         PTT - ( 04 May 2025 16:35 )  PTT:31.6 sec  Urinalysis Basic - ( 04 May 2025 16:35 )    Color: x / Appearance: x / SG: x / pH: x  Gluc: 102 mg/dL / Ketone: x  / Bili: x / Urobili: x   Blood: x / Protein: x / Nitrite: x   Leuk Esterase: x / RBC: x / WBC x   Sq Epi: x / Non Sq Epi: x / Bacteria: x            
INTERVAL HPI/OVERNIGHT EVENTS:  Pt seen and examined at bedside.     Allergies/Intolerance: No Known Allergies      MEDICATIONS  (STANDING):  aspirin enteric coated 81 milliGRAM(s) Oral daily  atorvastatin 40 milliGRAM(s) Oral at bedtime  oseltamivir 75 milliGRAM(s) Oral two times a day  tenofovir disoproxil fumarate (VIREAD) 300 milliGRAM(s) Oral daily    MEDICATIONS  (PRN):  acetaminophen     Tablet .. 650 milliGRAM(s) Oral every 6 hours PRN Temp greater or equal to 38C (100.4F)        ROS: all systems reviewed and wnl      PHYSICAL EXAMINATION:  Vital Signs Last 24 Hrs  T(C): 36.8 (06 May 2025 04:49), Max: 37.8 (05 May 2025 16:30)  T(F): 98.3 (06 May 2025 04:49), Max: 100 (05 May 2025 16:30)  HR: 83 (06 May 2025 04:49) (73 - 97)  BP: 104/69 (06 May 2025 04:49) (104/69 - 132/81)  BP(mean): --  RR: 20 (06 May 2025 04:49) (18 - 20)  SpO2: 97% (06 May 2025 04:49) (97% - 98%)    Parameters below as of 06 May 2025 04:49  Patient On (Oxygen Delivery Method): room air      CAPILLARY BLOOD GLUCOSE          05-05 @ 07:01  -  05-06 @ 07:00  --------------------------------------------------------  IN: 318 mL / OUT: 0 mL / NET: 318 mL        GENERAL: stable, left face numbness, ambulates in room by himself, speech fluent.   NECK: supple, No JVD  CHEST/LUNG: clear to auscultation bilaterally; no rales, rhonchi, or wheezing b/l  HEART: normal S1, S2  ABDOMEN: BS+, soft, ND, NT   EXTREMITIES:  pulses palpable; no clubbing, cyanosis, or edema b/l LEs    LABS:                        14.6   6.73  )-----------( 200      ( 04 May 2025 16:35 )             43.4     05-04    137  |  104  |  11  ----------------------------<  102[H]  3.5   |  29  |  0.92    Ca    8.7      04 May 2025 16:35    TPro  7.9  /  Alb  3.7  /  TBili  0.6  /  DBili  x   /  AST  26  /  ALT  46  /  AlkPhos  73  05-04    PT/INR - ( 04 May 2025 16:35 )   PT: 13.2 sec;   INR: 1.17 ratio         PTT - ( 04 May 2025 16:35 )  PTT:31.6 sec  Urinalysis Basic - ( 04 May 2025 16:35 )    Color: x / Appearance: x / SG: x / pH: x  Gluc: 102 mg/dL / Ketone: x  / Bili: x / Urobili: x   Blood: x / Protein: x / Nitrite: x   Leuk Esterase: x / RBC: x / WBC x   Sq Epi: x / Non Sq Epi: x / Bacteria: x

## 2025-05-06 NOTE — DISCHARGE NOTE PROVIDER - NSDCMRMEDTOKEN_GEN_ALL_CORE_FT
cefuroxime 500 mg oral tablet: 1 tab(s) orally every 12 hours  tenofovir: once a day  tenofovir disoproxil fumarate 300 mg oral tablet: 1 tab(s) orally once a day

## 2025-05-10 LAB
CULTURE RESULTS: SIGNIFICANT CHANGE UP
CULTURE RESULTS: SIGNIFICANT CHANGE UP
SPECIMEN SOURCE: SIGNIFICANT CHANGE UP
SPECIMEN SOURCE: SIGNIFICANT CHANGE UP